# Patient Record
Sex: FEMALE | Race: WHITE | NOT HISPANIC OR LATINO | Employment: FULL TIME
[De-identification: names, ages, dates, MRNs, and addresses within clinical notes are randomized per-mention and may not be internally consistent; named-entity substitution may affect disease eponyms.]

---

## 2017-02-22 ENCOUNTER — HOSPITAL ENCOUNTER (OUTPATIENT)
Dept: ADMINISTRATIVE | Facility: OTHER | Age: 37
Discharge: HOME | End: 2017-02-22
Attending: FAMILY MEDICINE | Admitting: FAMILY MEDICINE

## 2018-10-31 ENCOUNTER — AMBULATORY - HEALTHEAST (OUTPATIENT)
Dept: NURSING | Facility: CLINIC | Age: 38
End: 2018-10-31

## 2018-10-31 DIAGNOSIS — Z23 NEED FOR IMMUNIZATION AGAINST INFLUENZA: ICD-10-CM

## 2018-11-05 ENCOUNTER — COMMUNICATION - HEALTHEAST (OUTPATIENT)
Dept: HEALTH INFORMATION MANAGEMENT | Facility: CLINIC | Age: 38
End: 2018-11-05

## 2018-12-17 ENCOUNTER — OFFICE VISIT - HEALTHEAST (OUTPATIENT)
Dept: FAMILY MEDICINE | Facility: CLINIC | Age: 38
End: 2018-12-17

## 2018-12-17 ENCOUNTER — COMMUNICATION - HEALTHEAST (OUTPATIENT)
Dept: TELEHEALTH | Facility: CLINIC | Age: 38
End: 2018-12-17

## 2018-12-17 DIAGNOSIS — K58.9 IRRITABLE BOWEL SYNDROME, UNSPECIFIED TYPE: ICD-10-CM

## 2018-12-17 DIAGNOSIS — H10.9 CONJUNCTIVITIS OF BOTH EYES, UNSPECIFIED CONJUNCTIVITIS TYPE: ICD-10-CM

## 2018-12-17 ASSESSMENT — MIFFLIN-ST. JEOR: SCORE: 1242.42

## 2018-12-18 ENCOUNTER — COMMUNICATION - HEALTHEAST (OUTPATIENT)
Dept: SCHEDULING | Facility: CLINIC | Age: 38
End: 2018-12-18

## 2018-12-18 DIAGNOSIS — H10.9 BACTERIAL CONJUNCTIVITIS: ICD-10-CM

## 2019-02-25 ENCOUNTER — OFFICE VISIT - HEALTHEAST (OUTPATIENT)
Dept: FAMILY MEDICINE | Facility: CLINIC | Age: 39
End: 2019-02-25

## 2019-02-25 DIAGNOSIS — K58.9 IRRITABLE BOWEL SYNDROME, UNSPECIFIED TYPE: ICD-10-CM

## 2019-02-25 DIAGNOSIS — Z87.898 HX OF MOTION SICKNESS: ICD-10-CM

## 2019-06-17 ENCOUNTER — OFFICE VISIT - HEALTHEAST (OUTPATIENT)
Dept: FAMILY MEDICINE | Facility: CLINIC | Age: 39
End: 2019-06-17

## 2019-06-17 DIAGNOSIS — Z00.00 ROUTINE GENERAL MEDICAL EXAMINATION AT A HEALTH CARE FACILITY: ICD-10-CM

## 2019-06-17 DIAGNOSIS — K58.9 IRRITABLE BOWEL SYNDROME, UNSPECIFIED TYPE: ICD-10-CM

## 2019-06-17 LAB
CHOLEST SERPL-MCNC: 167 MG/DL
FASTING STATUS PATIENT QL REPORTED: YES
FASTING STATUS PATIENT QL REPORTED: YES
GLUCOSE BLD-MCNC: 90 MG/DL (ref 70–99)
HDLC SERPL-MCNC: 63 MG/DL
LDLC SERPL CALC-MCNC: 96 MG/DL
TRIGL SERPL-MCNC: 42 MG/DL

## 2019-06-17 ASSESSMENT — MIFFLIN-ST. JEOR: SCORE: 1258.18

## 2019-06-26 ENCOUNTER — COMMUNICATION - HEALTHEAST (OUTPATIENT)
Dept: FAMILY MEDICINE | Facility: CLINIC | Age: 39
End: 2019-06-26

## 2019-06-26 DIAGNOSIS — K58.9 IRRITABLE BOWEL SYNDROME, UNSPECIFIED TYPE: ICD-10-CM

## 2019-08-12 ENCOUNTER — OFFICE VISIT - HEALTHEAST (OUTPATIENT)
Dept: FAMILY MEDICINE | Facility: CLINIC | Age: 39
End: 2019-08-12

## 2019-08-12 DIAGNOSIS — Z97.5 IUD CONTRACEPTION: ICD-10-CM

## 2019-08-12 ASSESSMENT — MIFFLIN-ST. JEOR: SCORE: 1275.76

## 2019-08-13 ENCOUNTER — OFFICE VISIT - HEALTHEAST (OUTPATIENT)
Dept: FAMILY MEDICINE | Facility: CLINIC | Age: 39
End: 2019-08-13

## 2019-08-13 DIAGNOSIS — Z30.430 ENCOUNTER FOR IUD INSERTION: ICD-10-CM

## 2019-08-13 LAB — HCG UR QL: NEGATIVE

## 2019-08-13 ASSESSMENT — MIFFLIN-ST. JEOR: SCORE: 1275.76

## 2019-12-05 ENCOUNTER — OFFICE VISIT - HEALTHEAST (OUTPATIENT)
Dept: FAMILY MEDICINE | Facility: CLINIC | Age: 39
End: 2019-12-05

## 2019-12-05 DIAGNOSIS — T75.3XXS CAR SICKNESS, SEQUELA: ICD-10-CM

## 2019-12-05 DIAGNOSIS — Z87.898 HX OF MOTION SICKNESS: ICD-10-CM

## 2019-12-05 DIAGNOSIS — R10.13 ABDOMINAL PAIN, EPIGASTRIC: ICD-10-CM

## 2019-12-05 LAB
ALBUMIN SERPL-MCNC: 4.1 G/DL (ref 3.5–5)
ALP SERPL-CCNC: 38 U/L (ref 45–120)
ALT SERPL W P-5'-P-CCNC: 11 U/L (ref 0–45)
ANION GAP SERPL CALCULATED.3IONS-SCNC: 9 MMOL/L (ref 5–18)
AST SERPL W P-5'-P-CCNC: 18 U/L (ref 0–40)
BILIRUB SERPL-MCNC: 1.3 MG/DL (ref 0–1)
BUN SERPL-MCNC: 19 MG/DL (ref 8–22)
CALCIUM SERPL-MCNC: 9.6 MG/DL (ref 8.5–10.5)
CHLORIDE BLD-SCNC: 104 MMOL/L (ref 98–107)
CO2 SERPL-SCNC: 27 MMOL/L (ref 22–31)
CREAT SERPL-MCNC: 0.76 MG/DL (ref 0.6–1.1)
ERYTHROCYTE [DISTWIDTH] IN BLOOD BY AUTOMATED COUNT: 10.3 % (ref 11–14.5)
GFR SERPL CREATININE-BSD FRML MDRD: >60 ML/MIN/1.73M2
GLUCOSE BLD-MCNC: 91 MG/DL (ref 70–125)
HCT VFR BLD AUTO: 38.1 % (ref 35–47)
HGB BLD-MCNC: 13.4 G/DL (ref 12–16)
LIPASE SERPL-CCNC: 9 U/L (ref 0–52)
MCH RBC QN AUTO: 30.6 PG (ref 27–34)
MCHC RBC AUTO-ENTMCNC: 35.2 G/DL (ref 32–36)
MCV RBC AUTO: 87 FL (ref 80–100)
PLATELET # BLD AUTO: 278 THOU/UL (ref 140–440)
PMV BLD AUTO: 8.2 FL (ref 7–10)
POTASSIUM BLD-SCNC: 4.4 MMOL/L (ref 3.5–5)
PROT SERPL-MCNC: 7 G/DL (ref 6–8)
RBC # BLD AUTO: 4.39 MILL/UL (ref 3.8–5.4)
SODIUM SERPL-SCNC: 140 MMOL/L (ref 136–145)
TSH SERPL DL<=0.005 MIU/L-ACNC: 0.67 UIU/ML (ref 0.3–5)
WBC: 5.7 THOU/UL (ref 4–11)

## 2019-12-05 ASSESSMENT — MIFFLIN-ST. JEOR: SCORE: 1294.19

## 2019-12-06 ENCOUNTER — RECORDS - HEALTHEAST (OUTPATIENT)
Dept: ADMINISTRATIVE | Facility: OTHER | Age: 39
End: 2019-12-06

## 2019-12-09 LAB
GLIADIN IGA SER-ACNC: 0.4 U/ML
GLIADIN IGG SER-ACNC: 2.1 U/ML
IGA SERPL-MCNC: 135 MG/DL (ref 65–400)
TTG IGA SER-ACNC: 0.2 U/ML
TTG IGG SER-ACNC: <0.6 U/ML

## 2019-12-13 ENCOUNTER — RECORDS - HEALTHEAST (OUTPATIENT)
Dept: ADMINISTRATIVE | Facility: OTHER | Age: 39
End: 2019-12-13

## 2019-12-17 ENCOUNTER — RECORDS - HEALTHEAST (OUTPATIENT)
Dept: ADMINISTRATIVE | Facility: OTHER | Age: 39
End: 2019-12-17

## 2020-03-21 ENCOUNTER — COMMUNICATION - HEALTHEAST (OUTPATIENT)
Dept: FAMILY MEDICINE | Facility: CLINIC | Age: 40
End: 2020-03-21

## 2020-03-21 DIAGNOSIS — K58.9 IRRITABLE BOWEL SYNDROME, UNSPECIFIED TYPE: ICD-10-CM

## 2020-10-06 ENCOUNTER — VIRTUAL VISIT (OUTPATIENT)
Dept: FAMILY MEDICINE | Facility: OTHER | Age: 40
End: 2020-10-06

## 2020-10-07 NOTE — PROGRESS NOTES
"Date: 10/06/2020 19:00:51  Clinician: Fidencio Knowles  Clinician NPI: [id number]  Patient: Braxton Kilgore  Patient : 1980  Patient Address: [address] [place] [address]  Patient Phone: [phone]  Visit Protocol: URI  Patient Summary:  Braxotn is a 40 year old ( : 1980 ) female who initiated a [facility] Visit for COVID-19 (Coronavirus) evaluation and screening. When asked the question \"Please sign me up to receive news, health information and promotions from [facility].\", Braxton responded \"No\".    Braxton states her symptoms started today.   Her symptoms consist of enlarged lymph nodes, a cough, nasal congestion, anosmia, rhinitis, nausea, myalgia, chills, malaise, and a sore throat. Braxton also feels feverish.   Symptom details     Nasal secretions: The color of her mucus is clear.    Cough: Braxton coughs a few times an hour and her cough is not more bothersome at night. Phlegm does not come into her throat when she coughs. She does not believe her cough is caused by post-nasal drip.     Sore throat: Braxton reports having mild throat pain (1-3 on a 10 point pain scale), does not have exudate on her tonsils, and can swallow liquids. The lymph nodes in her neck are enlarged. A rash has not appeared on the skin since the sore throat started.     Temperature: Her current temperature is 102 degrees Fahrenheit. Braxton has had a temperature over 100 degrees Fahrenheit for 1-2 days.      Braxton denies having ear pain, headache, wheezing, vomiting, facial pain or pressure, teeth pain, ageusia, and diarrhea. She also denies taking antibiotic medication in the past month and having recent facial or sinus surgery in the past 60 days. She is not experiencing dyspnea.   Precipitating events  Within the past week, Braxton has not been exposed to someone with strep throat. She has not recently been exposed to someone with influenza. Braxton has been in close contact with the following high risk individuals: children " under the age of 5.   Pertinent COVID-19 (Coronavirus) information  In the past 14 days, Braxton has not worked in a congregate living setting.   She either works or volunteers as a healthcare worker or a , or works or volunteers in a healthcare facility. She does not provide direct patient care. 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 She lives with a healthcare worker.   Braxton has had a close contact with a laboratory-confirmed COVID-19 patient within 14 days of symptom onset. She was not exposed at her work. Additional information about contact with COVID-19 (Coronavirus) patient as reported by the patient (free text): school   Since December 2019, Braxton and has not had upper respiratory infection or influenza-like illness. Has not been diagnosed with lab-confirmed COVID-19 test   Pertinent medical history  Braxton typically gets a yeast infection when she takes antibiotics. She has used fluconazole (Diflucan) to treat previous yeast infections. 2 doses of fluconazole (Diflucan) has typically been needed for symptoms to resolve in the past.  Braxton needs a return to work/school note.   Weight: 145 lbs   Braxton does not smoke or use smokeless tobacco.   She denies pregnancy and denies breastfeeding. She does not menstruate.   Weight: 145 lbs    MEDICATIONS: No current medications, ALLERGIES: NKDA  Clinician Response:  Dear Braxton,   Your symptoms show that you may have coronavirus (COVID-19). This illness can cause fever, cough and trouble breathing. Many people get a mild case and get better on their own. Some people can get very sick.  Based on the symptoms you have shared, I would like you to be re-checked in 2 to 3 days. Please call your family clinic to set up a video or phone visit.  Will I be tested for COVID-19?  We would like to test you for this virus.   Please call  "[phone] to schedule your visit. Explain that you were [facility] have [facility] COVID-19 [facility]. Be ready to share your [facility] visit ID number.   The following will serve as your written order for this COVID Test, ordered by me, for the indication of suspected COVID [Z20.828]: The test will be ordered in [employer], our electronic health record, after you are scheduled. It will show as ordered and authorized by Magen Plata MD.  Order: COVID-19 (Coronavirus) PCR for SYMPTOMATIC testing from [facility].  1.When it's time for your COVID test:   Stay at least 6 feet away from others. (If someone will drive you to your test, stay in the backseat, as far away from the  as you can.)   Cover your mouth and nose with a mask, tissue or washcloth.  Go straight to the testing site. Don't make any stops on the way there or back.      2.Starting now: Stay home and away from others (self-isolate) until:   You've had no fever---and no medicine that reduces fever---for one full day (24 hours). And...   Your other symptoms have gotten better. For example, your cough or breathing has improved. And...   At least 10 days have passed since your symptoms started.       During this time, don't leave the house except for testing or medical care.   Stay in your own room, even for meals. Use your own bathroom if you can.   Stay away from others in your home. No hugging, kissing or shaking hands. No visitors.  Don't go to work, school or anywhere else.    Clean \"high touch\" surfaces often (doorknobs, counters, handles, etc.). Use a household cleaning spray or wipes. You'll find a full list of  on the EPA website: www.epa.gov/pesticide-registration/list-n-disinfectants-use-against-sars-cov-2.   Cover your mouth and nose with a mask, tissue or washcloth to avoid spreading germs.  Wash your hands and face often. Use soap and water.  Caregivers in these groups are at risk for severe illness due to COVID-19:  o People 65 years and older  " o People who live in a nursing home or long-term care facility  o People with chronic disease (lung, heart, cancer, diabetes, kidney, liver, immunologic)   o People who have a weakened immune system, including those who:   Are in cancer treatment  Take medicine that weakens the immune system, such as corticosteroids  Had a bone marrow or organ transplant  Have an immune deficiency  Have poorly controlled HIV or AIDS  Are obese (body mass index of 40 or higher)  Smoke regularly   o Caregivers should wear gloves while washing dishes, handling laundry and cleaning bedrooms and bathrooms.  o Use caution when washing and drying laundry: Don't shake dirty laundry, and use the warmest water setting that you can.  o For more tips, go to www.cdc.gov/coronavirus/2019-ncov/downloads/10Things.pdf.      How can I take care of myself?   Get lots of rest. Drink extra fluids (unless a doctor has told you not to)   Take Tylenol (acetaminophen) for fever or pain. If you have liver or kidney problems, ask your family doctor if it's okay to take Tylenol.   Adults can take either:    650 mg (two 325 mg pills) every 4 to 6 hours, or...   1,000 mg (two 500 mg pills) every 8 hours as needed.    Note: Don't take more than 3,000 mg in one day. Acetaminophen is found in many medicines (both prescribed and over-the-counter medicines). Read all labels to be sure you don't take too much.   For children, check the Tylenol bottle for the right dose. The dose is based on the child's age or weight.    If you have other health problems (like cancer, heart failure, an organ transplant or severe kidney disease): Call your specialty clinic if you don't feel better in the next 2 days.       Know when to call 911. Emergency warning signs include:    Trouble breathing or shortness of breath Pain or pressure in the chest that doesn't go away Feeling confused like you haven't felt before, or not being able to wake up Bluish-colored lips or face  Where can I  get more information?   [facility] -- About COVID-19: [email or web address]   CDC -- What to Do If You're Sick: www.cdc.gov/coronavirus/2019-ncov/about/steps-when-sick.html   [facility] -- Ending Home Isolation: www.cdc.gov/coronavirus/2019-ncov/hcp/disposition-in-home-patients.html   [facility] -- Caring for Someone: www.cdc.gov/coronavirus/2019-ncov/if-you-are-sick/care-for-someone.html   [facility] -- Interim Guidance for Hospital Discharge to Home: www.[facility].[place]/diseases/coronavirus/hcp/hospdischarge.pdf   [facility] clinical trials (COVID-19 research studies): [email or web address]-clinical-trials    Below are the COVID-19 hotlines at the [place] [facility] ([facility]). Interpreters are available.    For health questions: Call 198-079-3079 or 1-888.758.1051 (7 a.m. to 7 p.m.) For questions about schools and childcare: Call 231-046-7855 or 1-579.409.6278 (7 a.m. to 7 p.m.)       Diagnosis: Cough  Diagnosis ICD: R05

## 2020-10-08 ENCOUNTER — AMBULATORY - HEALTHEAST (OUTPATIENT)
Dept: FAMILY MEDICINE | Facility: CLINIC | Age: 40
End: 2020-10-08

## 2020-10-08 DIAGNOSIS — Z20.822 SUSPECTED 2019 NOVEL CORONAVIRUS INFECTION: ICD-10-CM

## 2020-10-09 ENCOUNTER — AMBULATORY - HEALTHEAST (OUTPATIENT)
Dept: FAMILY MEDICINE | Facility: CLINIC | Age: 40
End: 2020-10-09

## 2020-10-09 DIAGNOSIS — Z20.822 SUSPECTED 2019 NOVEL CORONAVIRUS INFECTION: ICD-10-CM

## 2020-10-11 ENCOUNTER — COMMUNICATION - HEALTHEAST (OUTPATIENT)
Dept: SCHEDULING | Facility: CLINIC | Age: 40
End: 2020-10-11

## 2020-12-12 ENCOUNTER — COMMUNICATION - HEALTHEAST (OUTPATIENT)
Dept: FAMILY MEDICINE | Facility: CLINIC | Age: 40
End: 2020-12-12

## 2020-12-12 DIAGNOSIS — Z87.898 HX OF MOTION SICKNESS: ICD-10-CM

## 2020-12-16 ENCOUNTER — COMMUNICATION - HEALTHEAST (OUTPATIENT)
Dept: FAMILY MEDICINE | Facility: CLINIC | Age: 40
End: 2020-12-16

## 2020-12-16 DIAGNOSIS — K58.9 IRRITABLE BOWEL SYNDROME, UNSPECIFIED TYPE: ICD-10-CM

## 2021-05-30 NOTE — TELEPHONE ENCOUNTER
Refill Approved    Rx renewed per Medication Renewal Policy. Medication was last renewed on 2/25/19.    Bernarda Vega, [facility] Triage/Med Refill 6/27/2019     Requested Prescriptions   Pending Prescriptions Disp Refills     amitriptyline (ELAVIL) 25 MG tablet [Pharmacy Med Name: AMITRIPTYLINE HCL 25 MG TAB] 30 tablet 3     Sig: TAKE 1 TABLET BY MOUTH EVERYDAY AT BEDTIME       Tricyclics/Misc Antidepressant/Antianxiety Meds Refill Protocol Passed - 6/26/2019  2:26 AM        Passed - PCP or prescribing provider visit in last year     Last office visit with prescriber/PCP: 2/25/2019 Cecy Mills MD OR same dept: 2/25/2019 Cecy Mills MD OR same specialty: 2/25/2019 Cecy Mills MD  Last physical: Visit date not found Last MTM visit: Visit date not found   Next visit within 3 mo: Visit date not found  Next physical within 3 mo: Visit date not found  Prescriber OR PCP: Cecy Mills MD  Last diagnosis associated with med order: 1. Irritable bowel syndrome, unspecified type  - amitriptyline (ELAVIL) 25 MG tablet [Pharmacy Med Name: AMITRIPTYLINE HCL 25 MG TAB]; TAKE 1 TABLET BY MOUTH EVERYDAY AT BEDTIME  Dispense: 30 tablet; Refill: 3    If protocol passes may refill for 12 months if within 3 months of last provider visit (or a total of 15 months).

## 2021-05-31 NOTE — PROGRESS NOTES
IUD Insertion Procedure Note    Pre-operative Diagnosis: Contraception, mirena IUD Insertion    Post-operative Diagnosis: same    Indications: contraception    Procedure Details   Urine pregnancy test was done and result was negative.  The risks (including infection, bleeding, pain, and uterine perforation) and benefits of the procedure were explained to the patient and Written informed consent was obtained.      Cervix cleansed with Betadine. Uterus sounded to 7 cm. IUD inserted without difficulty. String visible and trimmed. Patient tolerated procedure well.    Condition:  Stable    Complications:  None    Plan:  The patient was advised to call for any fever or for prolonged or severe pain or bleeding. She was advised to use OTC analgesics as needed for mild to moderate pain.  She was advised to feel for the strings in 2 weeks or come in if she is unable to feel the strings.

## 2021-05-31 NOTE — PROGRESS NOTES
ASSESSMENT/PLAN:  IUD contraception  This is a 38-year-old female who comes in today for IUD consultation.  She has already schedule an appointment tomorrow for IUD insertion.  Nonetheless, she was very upset that she had to have a consultation prior to IUD insertion.  She had a Mirena IUD removed in 2015.  She had a baby since who is now 2 years old.  Over the last 2 years she has not had any birth control.  She is interested in Mirena IUD.  She stated that she does not have any questions or concerns but was very upset that she had to make this appointment prior to IUD insertion.  I did spend a few minutes discussing with her the hormonal aspect of Mirena IUD as far as contraception and regulation of her menstrual cycles.  I also spent a few minutes discussing with her about the procedure, risks, and benefits.  She walked out on me prior to completion of our visit      CHIEF COMPLAINT:  Chief Complaint   Patient presents with     IUD consult     Mirena       HISTORY OF PRESENT ILLNESS:  Braxton is a 38 y.o. female presenting to the clinic today for an IUD consultation. The patient states that she would like to get the Mirena for her birth control.  She confirms that she recently had a child, who is now currently 2 years old. The patient states that her period has been normal and her last one was on Thursday.     REVIEW OF SYSTEMS:   Constitutional: Negative.   HENT: Negative.   Eyes: Negative.   Respiratory: Negative.   Cardiovascular: Negative.   Gastrointestinal: Negative.   Endocrine: Negative.   Genitourinary: Negative.   Musculoskeletal: Negative.   Skin: Negative.   Allergic/Immunologic: Negative.   Neurological: Negative.   Hematological: Negative.   Psychiatric/Behavioral: Negative.   All other systems are negative.    PFSH:  None.     TOBACCO USE:  Social History     Tobacco Use   Smoking Status Never Smoker   Smokeless Tobacco Never Used       VITALS:  Vitals:    08/12/19 1600   BP: 110/64   Pulse: 84  "  Weight: 134 lb (60.8 kg)   Height: 5' 4.5\" (1.638 m)     Wt Readings from Last 3 Encounters:   08/12/19 134 lb (60.8 kg)   06/17/19 130 lb 2 oz (59 kg)   02/25/19 128 lb (58.1 kg)       PHYSICAL EXAM:  Constitutional: Patient is oriented to person, place, and time. Patient appears well-developed and well-nourished. No distress.   Head: Normocephalic and atraumatic.   Right Ear: External ear normal.   Left Ear: External ear normal.   Nose: Nose normal.   Eyes: Conjunctivae and EOM are normal. Right eye exhibits no discharge. Left eye exhibits no discharge. No scleral icterus.   Skin: No rash noted. Patient is not diaphoretic. No erythema. No pallor.      Results for orders placed or performed in visit on 06/17/19   Glucose   Result Value Ref Range    Glucose 90 70 - 99 mg/dL    Patient Fasting > 8hrs? Yes    Lipid Cascade   Result Value Ref Range    Cholesterol 167 <=199 mg/dL    Triglycerides 42 <=149 mg/dL    HDL Cholesterol 63 >=50 mg/dL    LDL Calculated 96 <=129 mg/dL    Patient Fasting > 8hrs? Yes          ADDITIONAL HISTORY SUMMARIZED (2): None.  DECISION TO OBTAIN EXTRA INFORMATION (1): None.   RADIOLOGY TESTS (1): None.  LABS (1): None.  MEDICINE TESTS (1): None.  INDEPENDENT REVIEW (2 each): None.     Total data points:0    The visit lasted a total of 6 minutes face to face with the patient. Over 50% of the time was spent counseling and educating the patient about birth control options.     I, Linda Winn, am scribing for and in the presence of, Dr. Hassan.    IDr. Hassan, personally performed the services described in this documentation, as scribed by Linda Winn in my presence, and it is both accurate and complete.    MEDICATIONS:  Current Outpatient Medications   Medication Sig Dispense Refill     amitriptyline (ELAVIL) 25 MG tablet Take 1 tablet (25 mg total) by mouth at bedtime. 90 tablet 2     ondansetron (ZOFRAN ODT) 4 MG disintegrating tablet Take 1 tablet (4 mg total) by mouth every 8 (eight) " hours as needed for nausea. 30 tablet 1     ondansetron (ZOFRAN) 4 MG tablet Take 1 tablet (4 mg total) by mouth every 8 (eight) hours as needed for nausea. 30 tablet 1     No current facility-administered medications for this visit.

## 2021-06-01 ENCOUNTER — RECORDS - HEALTHEAST (OUTPATIENT)
Dept: ADMINISTRATIVE | Facility: CLINIC | Age: 41
End: 2021-06-01

## 2021-06-02 ENCOUNTER — RECORDS - HEALTHEAST (OUTPATIENT)
Dept: ADMINISTRATIVE | Facility: CLINIC | Age: 41
End: 2021-06-02

## 2021-06-02 VITALS — BODY MASS INDEX: 21.3 KG/M2 | WEIGHT: 128 LBS

## 2021-06-02 VITALS — BODY MASS INDEX: 20.81 KG/M2 | WEIGHT: 124.9 LBS | HEIGHT: 65 IN

## 2021-06-03 VITALS — WEIGHT: 134 LBS | BODY MASS INDEX: 22.33 KG/M2 | HEIGHT: 65 IN

## 2021-06-03 VITALS — BODY MASS INDEX: 22.33 KG/M2 | WEIGHT: 134 LBS | HEIGHT: 65 IN

## 2021-06-03 VITALS — BODY MASS INDEX: 21.68 KG/M2 | WEIGHT: 130.13 LBS | HEIGHT: 65 IN

## 2021-06-04 VITALS
SYSTOLIC BLOOD PRESSURE: 104 MMHG | BODY MASS INDEX: 23 KG/M2 | WEIGHT: 138.06 LBS | DIASTOLIC BLOOD PRESSURE: 68 MMHG | HEIGHT: 65 IN | HEART RATE: 68 BPM

## 2021-06-04 NOTE — PROGRESS NOTES
ASSESSMENT/PLAN:    Car sickness, Hx of motion sickness  refilled  -     ondansetron (ZOFRAN ODT) 4 MG disintegrating tablet; Take 1 tablet (4 mg total) by mouth every 8 (eight) hours as needed for nausea.    Abdominal pain, epigastric with h/o IBS & h/o h pylori infection  -     Ambulatory referral for Upper GI Endoscopy  -     HM2(CBC w/o Differential)  -     Celiac(Gluten)Antibody Panel  -     Comprehensive Metabolic Panel  -     Lipase  -     Thyroid Stimulating Hormone (TSH)  Continue with amitriptyline  Will communicate results to patient  Further management will depend on results and clinical status    Orders Placed This Encounter   Procedures     HM2(CBC w/o Differential)     Celiac(Gluten)Antibody Panel     Comprehensive Metabolic Panel     Lipase     Thyroid Stimulating Hormone (TSH)     Ambulatory referral for Upper GI Endoscopy     Referral Priority:   Routine     Referral Type:   Diagnostic Imaging     Referral Reason:   Evaluation and Treatment     Requested Specialty:   Gastroenterology     Number of Visits Requested:   1     CHIEF COMPLAINT:  Chief Complaint   Patient presents with     med check     possible ulcer     stomach pain, had this before, episodes of vomite,      Medication Refill     zofran       HISTORY OF PRESENT ILLNESS:  Braxton is a 39 y.o. female presenting to the clinic today for car sickness and stomach pain.     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 Some days she will feel okay, but other days she will get car sick. Whenever she gets car sick, she will take zofran 4 mg. She does not use it often. She has been this way since she was a child. It is especially bad if she does not eat and then reads in a car.     Stomach Pain: Recently, she has been having pain in her epigastric region. She has also been waking up in the middle of the night throwing up. This has happened multiple times. It is improved by eating. She has not taken  "any ibuprofen, tylenol or aleve. She has a history of gastrointestinal problems. She has irritable bowel syndrome which is well-managed with amitriptyline and maintains a plant-based diet. In 2011, she was having similar stomach problems that she is experiencing now. In 2011 she tested positive for H-pylori. She never had a post-treatment test. She inquires if this is a possibility again. She notes that she had a lot of other illnesses during that same time. Therefore, she is unsure what the exact symptoms of H-pylori look like. She also mentions that in 2011 a doctor from [facility] told her that her liver \"looked funny\" on the scans. However, she was septic at the time so he did not look into it further.     REVIEW OF SYSTEMS:   Constitutional: Negative.   HENT: Negative.   Eyes: Negative.   Respiratory: Negative.   Cardiovascular: Negative.   Gastrointestinal: Negative.   Endocrine: Negative.   Genitourinary: Negative.   Musculoskeletal: Negative.   Skin: Negative.   Allergic/Immunologic: Negative.   Neurological: Negative.   Hematological: Negative.   Psychiatric/Behavioral: Negative.   All other systems are negative.    PFSH:  She's been working for the police department since May. 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     TOBACCO USE:  Social History     Tobacco Use   Smoking Status Never Smoker   Smokeless Tobacco Never Used       VITALS:  Vitals:    12/05/19 1358   BP: 104/68   Patient Site: Left Arm   Patient Position: Sitting   Cuff Size: Adult Regular   Pulse: 68   Weight: 138 lb 1 oz (62.6 kg)   Height: 5' 4.5\" (1.638 m)     Wt Readings from Last 3 Encounters:   12/05/19 138 lb 1 oz (62.6 kg)   08/13/19 134 lb (60.8 kg)   08/12/19 134 lb (60.8 kg)       PHYSICAL EXAM:  Constitutional: Patient is oriented to person, place, and time. Patient appears well-developed and well-nourished. No distress.   Head: Normocephalic and atraumatic.   Right Ear: External ear normal.   Left Ear: External ear " normal.   Nose: Nose normal.   Eyes: Conjunctivae and EOM are normal. Right eye exhibits no discharge. Left eye exhibits no discharge. No scleral icterus.   Neurological: Patient is alert and oriented to person, place, and time. No cranial nerve deficit. Coordination normal.   Skin: No rash noted. Patient is not diaphoretic. No erythema. No pallor.    ADDITIONAL HISTORY SUMMARIZED (2): None.  DECISION TO OBTAIN EXTRA INFORMATION (1): None.   RADIOLOGY TESTS (1): None.  LABS (1): Reviewed labs from 09/25/18 and ordered labs today.   MEDICINE TESTS (1): None.  INDEPENDENT REVIEW (2 each): None.     Sarah ALEX,  am scribing for and in the presence of, Dr. Hassan.    I, Dr. Hassan, personally performed the services described in this documentation, as scribed by Sarah Yanes in my presence, and it is both accurate and complete.    MEDICATIONS:  Current Outpatient Medications   Medication Sig Dispense Refill     amitriptyline (ELAVIL) 25 MG tablet Take 1 tablet (25 mg total) by mouth at bedtime. 90 tablet 2     levonorgestrel (MIRENA) 20 mcg/24 hours (5 yrs) 52 mg IUD 1 each by Intrauterine route once. Inserted by Dr. Abril Hassan 8/13/19       ondansetron (ZOFRAN) 4 MG tablet Take 1 tablet (4 mg total) by mouth every 8 (eight) hours as needed for nausea. 30 tablet 1     ondansetron (ZOFRAN ODT) 4 MG disintegrating tablet Take 1 tablet (4 mg total) by mouth every 8 (eight) hours as needed for nausea. 90 tablet 1     No current facility-administered medications for this visit.        Total data points:1

## 2021-06-07 ENCOUNTER — RECORDS - HEALTHEAST (OUTPATIENT)
Dept: ADMINISTRATIVE | Facility: OTHER | Age: 41
End: 2021-06-07

## 2021-06-07 NOTE — TELEPHONE ENCOUNTER
Refill request for medication: 3/21/2020  Last visit addressing this medication: 12/5/19  Follow up plan as needed    Last refill on 6/27/19, quantity #90   CSA completed NA    checked  03/23/20, last dispensed refill NA     Appointment: Not due     Shilpa Barillas, A

## 2021-06-08 ENCOUNTER — APPOINTMENT (OUTPATIENT)
Dept: URBAN - METROPOLITAN AREA CLINIC 260 | Age: 41
Setting detail: DERMATOLOGY
End: 2021-06-09

## 2021-06-08 VITALS — HEIGHT: 65 IN | WEIGHT: 145 LBS

## 2021-06-08 DIAGNOSIS — L70.0 ACNE VULGARIS: ICD-10-CM

## 2021-06-08 DIAGNOSIS — L72.11 PILAR CYST: ICD-10-CM

## 2021-06-08 PROCEDURE — 81025 URINE PREGNANCY TEST: CPT

## 2021-06-08 PROCEDURE — 99204 OFFICE O/P NEW MOD 45 MIN: CPT

## 2021-06-08 PROCEDURE — OTHER ADDITIONAL NOTES: OTHER

## 2021-06-08 PROCEDURE — OTHER COUNSELING: OTHER

## 2021-06-08 PROCEDURE — OTHER ISOTRETINOIN INITIATION: OTHER

## 2021-06-08 PROCEDURE — OTHER URINE PREGNANCY TEST: OTHER

## 2021-06-08 ASSESSMENT — LOCATION SIMPLE DESCRIPTION DERM
LOCATION SIMPLE: RIGHT CHEEK
LOCATION SIMPLE: SUPERIOR FOREHEAD

## 2021-06-08 ASSESSMENT — LOCATION DETAILED DESCRIPTION DERM
LOCATION DETAILED: RIGHT CENTRAL BUCCAL CHEEK
LOCATION DETAILED: SUPERIOR MID FOREHEAD

## 2021-06-08 ASSESSMENT — LOCATION ZONE DERM: LOCATION ZONE: FACE

## 2021-06-08 ASSESSMENT — SEVERITY ASSESSMENT OVERALL AMONG ALL PATIENTS
IN YOUR EXPERIENCE, AMONG ALL PATIENTS YOU HAVE SEEN WITH THIS CONDITION, HOW SEVERE IS THIS PATIENT'S CONDITION?: MULTIPLE INFLAMMATORY LESIONS BUT NONINFLAMMATORY LESIONS PREDOMINATE

## 2021-06-08 NOTE — PROCEDURE: ADDITIONAL NOTES
Detail Level: Simple
Render Risk Assessment In Note?: no
Additional Notes: Fasting labs NOV in 30-37 days.
Additional Notes: Patient uses an encapsulated retinol otc by [employer]. Patient uses EOD. Patient’s skin tolerates this product well.
Additional Notes: Recommend a surgical slot to have removed. Did not measure today.

## 2021-06-09 NOTE — PROGRESS NOTES
[facility] OB Triage    Subjective: Braxton Overton is a  36 y.o. female at 24w3d with a current prenatal history that's unremarkable who presents to OB triage with concerns for contractions. Patient reports tighening after work today, despite fluids.  Had four stronger contractions.  No gush of fluid .   Fetal Movement: normal.    Estimated Date of Delivery: 17 Patient's last menstrual period was 2016 (exact date).  OB provider: Nick Garcia MD  OB History      Para Term  AB TAB SAB Ectopic Multiple Living    5 2 2  2  2   2          Objective:   Blood pressure 110/65, pulse 82, temperature 98.3  F (36.8  C), temperature source Oral, resp. rate 16, last menstrual period 2016, not currently breastfeeding.  General:   alert, appears stated age and cooperative   Skin:   normal   HEENT:  sclera clear, anicteric   Lungs:   clear to auscultation bilaterally   Heart:   regular rate and rhythm, S1, S2 normal, no murmur, click, rub or gallop   Extremities: Warm, dry and well perfused. No edema.   Neuro: Reflexes 2+ and symmetric.    Abdomen: FHT present   Membranes intact   [place]:  None   FHT: Baseline: 140 bpm   Presentation: unsure   Cervix: Not examined   Dilation: Closed   Effacement: Long   Station:  -3   Consistency: firm   Position: middle     Laboratory Studies:   Results for orders placed or performed during the hospital encounter of 17   Urinalysis, OB Screen   Result Value Ref Range    Glucose, UA Negative Negative    Ketones, UA Negative Negative    Protein, UA Negative Negative mg/dL        ASSESSMENT AND PLAN: Braxton Overton is a  36 y.o. female who presented to [facility] [place] at 24w3d on 2017 with concern for PTL.   1. Not in labor.  2. Encouraged fluids.        Douglas Martin MD 2017  [facility]

## 2021-06-09 NOTE — PROGRESS NOTES
"Patient arrived at 2122 with reports of >4 contractions within an hour, denies any change in discharge or vaginal leaking, contractions reported as painless and inconsistent, no aggravating or alleviating factors. Attempted rest and oral rehydration at home. UA/UC sent upon arrival, NST with Category 1 fetal heart tracing, 1 contraction noted throughout stay. IV inserted and 1L fluid infused over 45 minutes. Patient was reassured that baby was \"okay\". Discussed with Dr. Martin, patient okay to discharge to home. Patient advised when to call baby line and to follow-up with  OB/GYN for next scheduled appointment.  "

## 2021-06-13 NOTE — TELEPHONE ENCOUNTER
RN cannot approve Refill Request    RN can NOT refill this medication med is not covered by policy/route to provider. Last office visit: 12/5/2019 Carey Rg MD Last Physical: 6/17/2019 Last MTM visit: Visit date not found Last visit same specialty: 12/5/2019 Carey Rg MD.  Next visit within 3 mo: Visit date not found  Next physical within 3 mo: Visit date not found      Randa Ellington, [facility] Triage/Med Refill 12/12/2020    Requested Prescriptions   Pending Prescriptions Disp Refills     ondansetron (ZOFRAN-ODT) 4 MG disintegrating tablet [Pharmacy Med Name: ONDANSETRON ODT 4 MG TABLET] 18 tablet 9     Sig: TAKE 1 TABLET BY MOUTH EVERY 8 HOURS AS NEEDED FOR NAUSEA       There is no refill protocol information for this order

## 2021-06-13 NOTE — TELEPHONE ENCOUNTER
RN cannot approve Refill Request    RN can NOT refill this medication Protocol failed and NO refill given. Last office visit: 2/25/2019 Cecy Mills MD Last Physical: Visit date not found Last MTM visit: Visit date not found Last visit same specialty: 12/5/2019 Carey Rg MD.  Next visit within 3 mo: Visit date not found  Next physical within 3 mo: Visit date not found      Rebecca Mejia, [facility] [place] Connection Triage/Med Refill 12/18/2020    Requested Prescriptions   Pending Prescriptions Disp Refills     amitriptyline (ELAVIL) 25 MG tablet [Pharmacy Med Name: AMITRIPTYLINE HCL 25 MG TAB] 90 tablet 2     Sig: TAKE 1 TABLET BY MOUTH EVERYDAY AT BEDTIME       Tricyclics/Misc Antidepressant/Antianxiety Meds Refill Protocol Failed - 12/16/2020  8:37 AM        Failed - PCP or prescribing provider visit in last year     Last office visit with prescriber/PCP: 2/25/2019 Cecy Mills MD OR same dept: Visit date not found OR same specialty: 12/5/2019 Caery Rg MD  Last physical: Visit date not found Last MTM visit: Visit date not found   Next visit within 3 mo: Visit date not found  Next physical within 3 mo: Visit date not found  Prescriber OR PCP: Cecy Mills MD  Last diagnosis associated with med order: 1. Irritable bowel syndrome, unspecified type  - amitriptyline (ELAVIL) 25 MG tablet [Pharmacy Med Name: AMITRIPTYLINE HCL 25 MG TAB]; TAKE 1 TABLET BY MOUTH EVERYDAY AT BEDTIME  Dispense: 90 tablet; Refill: 2    If protocol passes may refill for 12 months if within 3 months of last provider visit (or a total of 15 months).

## 2021-06-16 ENCOUNTER — APPOINTMENT (OUTPATIENT)
Dept: URBAN - METROPOLITAN AREA CLINIC 260 | Age: 41
Setting detail: DERMATOLOGY
End: 2021-06-19

## 2021-06-16 DIAGNOSIS — L70.0 ACNE VULGARIS: ICD-10-CM

## 2021-06-16 DIAGNOSIS — L72.11 PILAR CYST: ICD-10-CM

## 2021-06-16 PROCEDURE — 99214 OFFICE O/P EST MOD 30 MIN: CPT | Mod: 25

## 2021-06-16 PROCEDURE — OTHER PATHOLOGY BILLING: OTHER

## 2021-06-16 PROCEDURE — OTHER PRESCRIPTION MEDICATION MANAGEMENT: OTHER

## 2021-06-16 PROCEDURE — OTHER COUNSELING: OTHER

## 2021-06-16 PROCEDURE — 11442 EXC FACE-MM B9+MARG 1.1-2 CM: CPT

## 2021-06-16 PROCEDURE — OTHER EXCISION: OTHER

## 2021-06-16 PROCEDURE — 88305 TISSUE EXAM BY PATHOLOGIST: CPT

## 2021-06-16 PROCEDURE — OTHER PRESCRIPTION: OTHER

## 2021-06-16 RX ORDER — TRETIONIN 0.25 MG/G
CREAM TOPICAL QHS
Qty: 1 | Refills: 1 | Status: ERX

## 2021-06-16 RX ORDER — SPIRONOLACTONE 25 MG/1
25MG TABLET, FILM COATED ORAL BID
Qty: 60 | Refills: 2 | Status: ERX | COMMUNITY
Start: 2021-06-16

## 2021-06-16 RX ORDER — TRETIONIN 0.25 MG/G
0.025% CREAM TOPICAL QHS
Qty: 1 | Refills: 1 | Status: ERX | COMMUNITY
Start: 2021-06-16

## 2021-06-16 ASSESSMENT — LOCATION DETAILED DESCRIPTION DERM
LOCATION DETAILED: SUPERIOR MID FOREHEAD
LOCATION DETAILED: LEFT INFERIOR CENTRAL MALAR CHEEK

## 2021-06-16 ASSESSMENT — LOCATION ZONE DERM: LOCATION ZONE: FACE

## 2021-06-16 ASSESSMENT — LOCATION SIMPLE DESCRIPTION DERM
LOCATION SIMPLE: LEFT CHEEK
LOCATION SIMPLE: SUPERIOR FOREHEAD

## 2021-06-16 NOTE — PROCEDURE: PRESCRIPTION MEDICATION MANAGEMENT
Render In Strict Bullet Format?: No
Plan: Patient elects not to start Isotretinoin as initiated at last office visit. Patient will start Spironolactone 25mg QD for 1 week and if tolerated will begin 25mg BID. Patient will also start use of Tretinoin 0.025% QHS.
Detail Level: Simple

## 2021-06-16 NOTE — HPI: PIMPLES (ACNE)
What Type Of Note Output Would You Prefer (Optional)?: Bullet Format
How Severe Is Your Acne?: moderate
Is This A New Presentation, Or A Follow-Up?: Follow Up Acne
Additional Comments (Use Complete Sentences): Patient declines previously discussed accutane start, due to concern about side effects and possible job interfereance

## 2021-06-16 NOTE — PROCEDURE: EXCISION
Suturegard Intro: Intraoperative tissue expansion was performed, utilizing the SUTUREGARD device, in order to reduce wound tension.
Mercedes Flap Text: The defect edges were debeveled with a #15 scalpel blade.  Given the location of the defect, shape of the defect and the proximity to free margins a Mercedes flap was deemed most appropriate.  Using a sterile surgical marker, an appropriate advancement flap was drawn incorporating the defect and placing the expected incisions within the relaxed skin tension lines where possible. The area thus outlined was incised deep to adipose tissue with a #15 scalpel blade.  The skin margins were undermined to an appropriate distance in all directions utilizing iris scissors.
Complex Repair And Xenograft Text: The defect edges were debeveled with a #15 scalpel blade.  The primary defect was closed partially with a complex linear closure.  Given the location of the defect, shape of the defect and the proximity to free margins a xenograft was deemed most appropriate to repair the remaining defect.  The graft was trimmed to fit the size of the remaining defect.  The graft was then placed in the primary defect, oriented appropriately, and sutured into place.
Repair Type: None (Simple)
Cheek-To-Nose Interpolation Flap Text: A decision was made to reconstruct the defect utilizing an interpolation axial flap and a staged reconstruction.  A telfa template was made of the defect.  This telfa template was then used to outline the Cheek-To-Nose Interpolation flap.  The donor area for the pedicle flap was then injected with anesthesia.  The flap was excised through the skin and subcutaneous tissue down to the layer of the underlying musculature.  The interpolation flap was carefully excised within this deep plane to maintain its blood supply.  The edges of the donor site were undermined.   The donor site was closed in a primary fashion.  The pedicle was then rotated into position and sutured.  Once the tube was sutured into place, adequate blood supply was confirmed with blanching and refill.  The pedicle was then wrapped with xeroform gauze and dressed appropriately with a telfa and gauze bandage to ensure continued blood supply and protect the attached pedicle.
Double O-Z Plasty Text: The defect edges were debeveled with a #15 scalpel blade.  Given the location of the defect, shape of the defect and the proximity to free margins a Double O-Z plasty (double transposition flap) was deemed most appropriate.  Using a sterile surgical marker, the appropriate transposition flaps were drawn incorporating the defect and placing the expected incisions within the relaxed skin tension lines where possible. The area thus outlined was incised deep to adipose tissue with a #15 scalpel blade.  The skin margins were undermined to an appropriate distance in all directions utilizing iris scissors.  Hemostasis was achieved with electrocautery.  The flaps were then transposed into place, one clockwise and the other counterclockwise, and anchored with interrupted buried subcutaneous sutures.
Crescentic Advancement Flap Text: The defect edges were debeveled with a #15 scalpel blade.  Given the location of the defect and the proximity to free margins a crescentic advancement flap was deemed most appropriate.  Using a sterile surgical marker, the appropriate advancement flap was drawn incorporating the defect and placing the expected incisions within the relaxed skin tension lines where possible.    The area thus outlined was incised deep to adipose tissue with a #15 scalpel blade.  The skin margins were undermined to an appropriate distance in all directions utilizing iris scissors.
Wound Care: Petrolatum
Complex Repair And Dorsal Nasal Flap Text: The defect edges were debeveled with a #15 scalpel blade.  The primary defect was closed partially with a complex linear closure.  Given the location of the remaining defect, shape of the defect and the proximity to free margins a dorsal nasal flap was deemed most appropriate for complete closure of the defect.  Using a sterile surgical marker, an appropriate flap was drawn incorporating the defect and placing the expected incisions within the relaxed skin tension lines where possible.    The area thus outlined was incised deep to adipose tissue with a #15 scalpel blade.  The skin margins were undermined to an appropriate distance in all directions utilizing iris scissors.
Island Pedicle Flap With Canthal Suspension Text: The defect edges were debeveled with a #15 scalpel blade.  Given the location of the defect, shape of the defect and the proximity to free margins an island pedicle advancement flap was deemed most appropriate.  Using a sterile surgical marker, an appropriate advancement flap was drawn incorporating the defect, outlining the appropriate donor tissue and placing the expected incisions within the relaxed skin tension lines where possible. The area thus outlined was incised deep to adipose tissue with a #15 scalpel blade.  The skin margins were undermined to an appropriate distance in all directions around the primary defect and laterally outward around the island pedicle utilizing iris scissors.  There was minimal undermining beneath the pedicle flap. A suspension suture was placed in the canthal tendon to prevent tension and prevent ectropion.
Complex Repair And Rhombic Flap Text: The defect edges were debeveled with a #15 scalpel blade.  The primary defect was closed partially with a complex linear closure.  Given the location of the remaining defect, shape of the defect and the proximity to free margins a rhombic flap was deemed most appropriate for complete closure of the defect.  Using a sterile surgical marker, an appropriate advancement flap was drawn incorporating the defect and placing the expected incisions within the relaxed skin tension lines where possible.    The area thus outlined was incised deep to adipose tissue with a #15 scalpel blade.  The skin margins were undermined to an appropriate distance in all directions utilizing iris scissors.
Number Of Epidermal Sutures (Optional): 2
Ear Star Wedge Flap Text: The defect edges were debeveled with a #15 blade scalpel.  Given the location of the defect and the proximity to free margins (helical rim) an ear star wedge flap was deemed most appropriate.  Using a sterile surgical marker, the appropriate flap was drawn incorporating the defect and placing the expected incisions between the helical rim and antihelix where possible.  The area thus outlined was incised through and through with a #15 scalpel blade.
Double M-Plasty Intermediate Repair Preamble Text (Leave Blank If You Do Not Want): Undermining was performed with blunt dissection.
Secondary Defect Width (In Cm): 0
Complex Repair And Modified Advancement Flap Text: The defect edges were debeveled with a #15 scalpel blade.  The primary defect was closed partially with a complex linear closure.  Given the location of the remaining defect, shape of the defect and the proximity to free margins a modified advancement flap was deemed most appropriate for complete closure of the defect.  Using a sterile surgical marker, an appropriate advancement flap was drawn incorporating the defect and placing the expected incisions within the relaxed skin tension lines where possible.    The area thus outlined was incised deep to adipose tissue with a #15 scalpel blade.  The skin margins were undermined to an appropriate distance in all directions utilizing iris scissors.
Vermilion Border Text: The closure involved the vermilion border.
Bill 48362 For Specimen Handling/Conveyance To Laboratory?: no
Show Anatomic Location From Referring Provider Variable: Yes
Scalpel Size: 15 blade
Hemigard Retention Suture: 2-0 Nylon
Retention Suture Text: Retention sutures were placed to support the closure and prevent dehiscence.
Transposition Flap Text: The defect edges were debeveled with a #15 scalpel blade.  Given the location of the defect and the proximity to free margins a transposition flap was deemed most appropriate.  Using a sterile surgical marker, an appropriate transposition flap was drawn incorporating the defect.    The area thus outlined was incised deep to adipose tissue with a #15 scalpel blade.  The skin margins were undermined to an appropriate distance in all directions utilizing iris scissors.
Retention Suture Bite Size: 3 mm
Ftsg Text: The defect edges were debeveled with a #15 scalpel blade.  Given the location of the defect, shape of the defect and the proximity to free margins a full thickness skin graft was deemed most appropriate.  Using a sterile surgical marker, the primary defect shape was transferred to the donor site. The area thus outlined was incised deep to adipose tissue with a #15 scalpel blade.  The harvested graft was then trimmed of adipose tissue until only dermis and epidermis was left.  The skin margins of the secondary defect were undermined to an appropriate distance in all directions utilizing iris scissors.  The secondary defect was closed with interrupted buried subcutaneous sutures.  The skin edges were then re-apposed with running  sutures.  The skin graft was then placed in the primary defect and oriented appropriately.
Lip Wedge Excision Repair Text: Given the location of the defect and the proximity to free margins a full thickness wedge repair was deemed most appropriate.  Using a sterile surgical marker, the appropriate repair was drawn incorporating the defect and placing the expected incisions perpendicular to the vermilion border.  The vermilion border was also meticulously outlined to ensure appropriate reapproximation during the repair.  The area thus outlined was incised through and through with a #15 scalpel blade.  The muscularis and dermis were reaproximated with deep sutures following hemostasis. Care was taken to realign the vermilion border before proceeding with the superficial closure.  Once the vermilion was realigned the superfical and mucosal closure was finished.
Epidermal Closure Graft Donor Site (Optional): simple interrupted
Cheek Interpolation Flap Text: A decision was made to reconstruct the defect utilizing an interpolation axial flap and a staged reconstruction.  A telfa template was made of the defect.  This telfa template was then used to outline the Cheek Interpolation flap.  The donor area for the pedicle flap was then injected with anesthesia.  The flap was excised through the skin and subcutaneous tissue down to the layer of the underlying musculature.  The interpolation flap was carefully excised within this deep plane to maintain its blood supply.  The edges of the donor site were undermined.   The donor site was closed in a primary fashion.  The pedicle was then rotated into position and sutured.  Once the tube was sutured into place, adequate blood supply was confirmed with blanching and refill.  The pedicle was then wrapped with xeroform gauze and dressed appropriately with a telfa and gauze bandage to ensure continued blood supply and protect the attached pedicle.
Star Wedge Flap Text: The defect edges were debeveled with a #15 scalpel blade.  Given the location of the defect, shape of the defect and the proximity to free margins a star wedge flap was deemed most appropriate.  Using a sterile surgical marker, an appropriate rotation flap was drawn incorporating the defect and placing the expected incisions within the relaxed skin tension lines where possible. The area thus outlined was incised deep to adipose tissue with a #15 scalpel blade.  The skin margins were undermined to an appropriate distance in all directions utilizing iris scissors.
Complex Repair And Tissue Cultured Epidermal Autograft Text: The defect edges were debeveled with a #15 scalpel blade.  The primary defect was closed partially with a complex linear closure.  Given the location of the defect, shape of the defect and the proximity to free margins an tissue cultured epidermal autograft was deemed most appropriate to repair the remaining defect.  The graft was trimmed to fit the size of the remaining defect.  The graft was then placed in the primary defect, oriented appropriately, and sutured into place.
Complex Repair And Z Plasty Text: The defect edges were debeveled with a #15 scalpel blade.  The primary defect was closed partially with a complex linear closure.  Given the location of the remaining defect, shape of the defect and the proximity to free margins a Z plasty was deemed most appropriate for complete closure of the defect.  Using a sterile surgical marker, an appropriate advancement flap was drawn incorporating the defect and placing the expected incisions within the relaxed skin tension lines where possible.    The area thus outlined was incised deep to adipose tissue with a #15 scalpel blade.  The skin margins were undermined to an appropriate distance in all directions utilizing iris scissors.
O-Z Plasty Text: The defect edges were debeveled with a #15 scalpel blade.  Given the location of the defect, shape of the defect and the proximity to free margins an O-Z plasty (double transposition flap) was deemed most appropriate.  Using a sterile surgical marker, the appropriate transposition flaps were drawn incorporating the defect and placing the expected incisions within the relaxed skin tension lines where possible.    The area thus outlined was incised deep to adipose tissue with a #15 scalpel blade.  The skin margins were undermined to an appropriate distance in all directions utilizing iris scissors.  Hemostasis was achieved with electrocautery.  The flaps were then transposed into place, one clockwise and the other counterclockwise, and anchored with interrupted buried subcutaneous sutures.
V-Y Flap Text: The defect edges were debeveled with a #15 scalpel blade.  Given the location of the defect, shape of the defect and the proximity to free margins a V-Y flap was deemed most appropriate.  Using a sterile surgical marker, an appropriate advancement flap was drawn incorporating the defect and placing the expected incisions within the relaxed skin tension lines where possible.    The area thus outlined was incised deep to adipose tissue with a #15 scalpel blade.  The skin margins were undermined to an appropriate distance in all directions utilizing iris scissors.
Island Pedicle Flap Text: The defect edges were debeveled with a #15 scalpel blade.  Given the location of the defect, shape of the defect and the proximity to free margins an island pedicle advancement flap was deemed most appropriate.  Using a sterile surgical marker, an appropriate advancement flap was drawn incorporating the defect, outlining the appropriate donor tissue and placing the expected incisions within the relaxed skin tension lines where possible.    The area thus outlined was incised deep to adipose tissue with a #15 scalpel blade.  The skin margins were undermined to an appropriate distance in all directions around the primary defect and laterally outward around the island pedicle utilizing iris scissors.  There was minimal undermining beneath the pedicle flap.
Chonodrocutaneous Helical Advancement Flap Text: The defect edges were debeveled with a #15 scalpel blade.  Given the location of the defect and the proximity to free margins a chondrocutaneous helical advancement flap was deemed most appropriate.  Using a sterile surgical marker, the appropriate advancement flap was drawn incorporating the defect and placing the expected incisions within the relaxed skin tension lines where possible.    The area thus outlined was incised deep to adipose tissue with a #15 scalpel blade.  The skin margins were undermined to an appropriate distance in all directions utilizing iris scissors.
Complex Repair And Rotation Flap Text: The defect edges were debeveled with a #15 scalpel blade.  The primary defect was closed partially with a complex linear closure.  Given the location of the remaining defect, shape of the defect and the proximity to free margins a rotation flap was deemed most appropriate for complete closure of the defect.  Using a sterile surgical marker, an appropriate advancement flap was drawn incorporating the defect and placing the expected incisions within the relaxed skin tension lines where possible.    The area thus outlined was incised deep to adipose tissue with a #15 scalpel blade.  The skin margins were undermined to an appropriate distance in all directions utilizing iris scissors.
Orbicularis Oris Muscle Flap Text: The defect edges were debeveled with a #15 scalpel blade.  Given that the defect affected the competency of the oral sphincter an obicularis oris muscle flap was deemed most appropriate to restore this competency and normal muscle function.  Using a sterile surgical marker, an appropriate flap was drawn incorporating the defect. The area thus outlined was incised with a #15 scalpel blade.
Crescentic Complex Repair Preamble Text (Leave Blank If You Do Not Want): Extensive wide undermining was performed.
Skin Substitute Text: The defect edges were debeveled with a #15 scalpel blade.  Given the location of the defect, shape of the defect and the proximity to free margins a skin substitute graft was deemed most appropriate.  The graft material was trimmed to fit the size of the defect. The graft was then placed in the primary defect and oriented appropriately.
Additional Anesthesia Volume In Cc: 6
Helical Rim Text: The closure involved the helical rim.
Medical Necessity Clause: This procedure was medically necessary because the lesion that was treated was:
Debridement Text: The wound edges were debrided prior to proceeding with the closure to facilitate wound healing.
Consent was obtained from the patient. The risks and benefits to therapy were discussed in detail. Specifically, the risks of infection, scarring, bleeding, prolonged wound healing, incomplete removal, allergy to anesthesia, nerve injury and recurrence were addressed. Prior to the procedure, the treatment site was clearly identified and confirmed by the patient. All components of Universal Protocol/PAUSE Rule completed.
Epidermal Autograft Text: The defect edges were debeveled with a #15 scalpel blade.  Given the location of the defect, shape of the defect and the proximity to free margins an epidermal autograft was deemed most appropriate.  Using a sterile surgical marker, the primary defect shape was transferred to the donor site. The epidermal graft was then harvested.  The skin graft was then placed in the primary defect and oriented appropriately.
Hemostasis: Ligature
Spiral Flap Text: The defect edges were debeveled with a #15 scalpel blade.  Given the location of the defect, shape of the defect and the proximity to free margins a spiral flap was deemed most appropriate.  Using a sterile surgical marker, an appropriate rotation flap was drawn incorporating the defect and placing the expected incisions within the relaxed skin tension lines where possible. The area thus outlined was incised deep to adipose tissue with a #15 scalpel blade.  The skin margins were undermined to an appropriate distance in all directions utilizing iris scissors.
Complex Repair And Dermal Autograft Text: The defect edges were debeveled with a #15 scalpel blade.  The primary defect was closed partially with a complex linear closure.  Given the location of the defect, shape of the defect and the proximity to free margins an dermal autograft was deemed most appropriate to repair the remaining defect.  The graft was trimmed to fit the size of the remaining defect.  The graft was then placed in the primary defect, oriented appropriately, and sutured into place.
Paramedian Forehead Flap Text: A decision was made to reconstruct the defect utilizing an interpolation axial flap and a staged reconstruction.  A telfa template was made of the defect.  This telfa template was then used to outline the paramedian forehead pedicle flap.  The donor area for the pedicle flap was then injected with anesthesia.  The flap was excised through the skin and subcutaneous tissue down to the layer of the underlying musculature.  The pedicle flap was carefully excised within this deep plane to maintain its blood supply.  The edges of the donor site were undermined.   The donor site was closed in a primary fashion.  The pedicle was then rotated into position and sutured.  Once the tube was sutured into place, adequate blood supply was confirmed with blanching and refill.  The pedicle was then wrapped with xeroform gauze and dressed appropriately with a telfa and gauze bandage to ensure continued blood supply and protect the attached pedicle.
Billing Type: Client Bill
Double O-Z Flap Text: The defect edges were debeveled with a #15 scalpel blade.  Given the location of the defect, shape of the defect and the proximity to free margins a Double O-Z flap was deemed most appropriate.  Using a sterile surgical marker, an appropriate transposition flap was drawn incorporating the defect and placing the expected incisions within the relaxed skin tension lines where possible. The area thus outlined was incised deep to adipose tissue with a #15 scalpel blade.  The skin margins were undermined to an appropriate distance in all directions utilizing iris scissors.
Complex Repair And W Plasty Text: The defect edges were debeveled with a #15 scalpel blade.  The primary defect was closed partially with a complex linear closure.  Given the location of the remaining defect, shape of the defect and the proximity to free margins a W plasty was deemed most appropriate for complete closure of the defect.  Using a sterile surgical marker, an appropriate advancement flap was drawn incorporating the defect and placing the expected incisions within the relaxed skin tension lines where possible.    The area thus outlined was incised deep to adipose tissue with a #15 scalpel blade.  The skin margins were undermined to an appropriate distance in all directions utilizing iris scissors.
Zygomaticofacial Flap Text: Given the location of the defect, shape of the defect and the proximity to free margins a zygomaticofacial flap was deemed most appropriate for repair.  Using a sterile surgical marker, the appropriate flap was drawn incorporating the defect and placing the expected incisions within the relaxed skin tension lines where possible. The area thus outlined was incised deep to adipose tissue with a #15 scalpel blade with preservation of a vascular pedicle.  The skin margins were undermined to an appropriate distance in all directions utilizing iris scissors.  The flap was then placed into the defect and anchored with interrupted buried subcutaneous sutures.
Where Do You Want The Question To Include Opioid Counseling Located?: Case Summary Tab
Number Of Hemigard Strips Per Side: 1
Home Suture Removal Text: Patient was provided a home suture removal kit and will remove their sutures at home.  If they have any questions or difficulties they will call the office.
Complex Repair And Double Advancement Flap Text: The defect edges were debeveled with a #15 scalpel blade.  The primary defect was closed partially with a complex linear closure.  Given the location of the remaining defect, shape of the defect and the proximity to free margins a double advancement flap was deemed most appropriate for complete closure of the defect.  Using a sterile surgical marker, an appropriate advancement flap was drawn incorporating the defect and placing the expected incisions within the relaxed skin tension lines where possible.    The area thus outlined was incised deep to adipose tissue with a #15 scalpel blade.  The skin margins were undermined to an appropriate distance in all directions utilizing iris scissors.
Bilateral Helical Rim Advancement Flap Text: The defect edges were debeveled with a #15 blade scalpel.  Given the location of the defect and the proximity to free margins (helical rim) a bilateral helical rim advancement flap was deemed most appropriate.  Using a sterile surgical marker, the appropriate advancement flaps were drawn incorporating the defect and placing the expected incisions between the helical rim and antihelix where possible.  The area thus outlined was incised through and through with a #15 scalpel blade.  With a skin hook and iris scissors, the flaps were gently and sharply undermined and freed up.
Perilesional Excision Additional Text (Leave Blank If You Do Not Want): The margin was drawn around the clinically apparent lesion. Incisions were then made along these lines to the appropriate tissue plane and the lesion was extirpated.
Dermal Autograft Text: The defect edges were debeveled with a #15 scalpel blade.  Given the location of the defect, shape of the defect and the proximity to free margins a dermal autograft was deemed most appropriate.  Using a sterile surgical marker, the primary defect shape was transferred to the donor site. The area thus outlined was incised deep to adipose tissue with a #15 scalpel blade.  The harvested graft was then trimmed of adipose and epidermal tissue until only dermis was left.  The skin graft was then placed in the primary defect and oriented appropriately.
Nasalis-Muscle-Based Myocutaneous Island Pedicle Flap Text: Using a #15 blade, an incision was made around the donor flap to the level of the nasalis muscle. Wide lateral undermining was then performed in both the subcutaneous plane above the nasalis muscle, and in a submuscular plane just above periosteum. This allowed the formation of a free nasalis muscle axial pedicle (based on the angular artery) which was still attached to the actual cutaneous flap, increasing its mobility and vascular viability. Hemostasis was obtained with pinpoint electrocoagulation. The flap was mobilized into position and the pivotal anchor points positioned and stabilized with buried interrupted sutures. Subcutaneous and dermal tissues were closed in a multilayered fashion with sutures. Tissue redundancies were excised, and the epidermal edges were apposed without significant tension and sutured with sutures.
Information: Selecting Yes will display possible errors in your note based on the variables you have selected. This validation is only offered as a suggestion for you. PLEASE NOTE THAT THE VALIDATION TEXT WILL BE REMOVED WHEN YOU FINALIZE YOUR NOTE. IF YOU WANT TO FAX A PRELIMINARY NOTE YOU WILL NEED TO TOGGLE THIS TO 'NO' IF YOU DO NOT WANT IT IN YOUR FAXED NOTE.
Epidermal Sutures: 4-0 Prolene
Anesthesia Volume In Cc: 3
Composite Graft Text: The defect edges were debeveled with a #15 scalpel blade.  Given the location of the defect, shape of the defect, the proximity to free margins and the fact the defect was full thickness a composite graft was deemed most appropriate.  The defect was outline and then transferred to the donor site.  A full thickness graft was then excised from the donor site. The graft was then placed in the primary defect, oriented appropriately and then sutured into place.  The secondary defect was then repaired using a primary closure.
Complex Repair And Epidermal Autograft Text: The defect edges were debeveled with a #15 scalpel blade.  The primary defect was closed partially with a complex linear closure.  Given the location of the defect, shape of the defect and the proximity to free margins an epidermal autograft was deemed most appropriate to repair the remaining defect.  The graft was trimmed to fit the size of the remaining defect.  The graft was then placed in the primary defect, oriented appropriately, and sutured into place.
Posterior Auricular Interpolation Flap Text: A decision was made to reconstruct the defect utilizing an interpolation axial flap and a staged reconstruction.  A telfa template was made of the defect.  This telfa template was then used to outline the posterior auricular interpolation flap.  The donor area for the pedicle flap was then injected with anesthesia.  The flap was excised through the skin and subcutaneous tissue down to the layer of the underlying musculature.  The pedicle flap was carefully excised within this deep plane to maintain its blood supply.  The edges of the donor site were undermined.   The donor site was closed in a primary fashion.  The pedicle was then rotated into position and sutured.  Once the tube was sutured into place, adequate blood supply was confirmed with blanching and refill.  The pedicle was then wrapped with xeroform gauze and dressed appropriately with a telfa and gauze bandage to ensure continued blood supply and protect the attached pedicle.
Anesthesia Type: 2% lidocaine with epinephrine
Rotation Flap Text: The defect edges were debeveled with a #15 scalpel blade.  Given the location of the defect, shape of the defect and the proximity to free margins a rotation flap was deemed most appropriate.  Using a sterile surgical marker, an appropriate rotation flap was drawn incorporating the defect and placing the expected incisions within the relaxed skin tension lines where possible.    The area thus outlined was incised deep to adipose tissue with a #15 scalpel blade.  The skin margins were undermined to an appropriate distance in all directions utilizing iris scissors.
Complex Repair And Melolabial Flap Text: The defect edges were debeveled with a #15 scalpel blade.  The primary defect was closed partially with a complex linear closure.  Given the location of the remaining defect, shape of the defect and the proximity to free margins a melolabial flap was deemed most appropriate for complete closure of the defect.  Using a sterile surgical marker, an appropriate advancement flap was drawn incorporating the defect and placing the expected incisions within the relaxed skin tension lines where possible.    The area thus outlined was incised deep to adipose tissue with a #15 scalpel blade.  The skin margins were undermined to an appropriate distance in all directions utilizing iris scissors.
Detail Level: Detailed
O-T Plasty Text: The defect edges were debeveled with a #15 scalpel blade.  Given the location of the defect, shape of the defect and the proximity to free margins an O-T plasty was deemed most appropriate.  Using a sterile surgical marker, an appropriate O-T plasty was drawn incorporating the defect and placing the expected incisions within the relaxed skin tension lines where possible.    The area thus outlined was incised deep to adipose tissue with a #15 scalpel blade.  The skin margins were undermined to an appropriate distance in all directions utilizing iris scissors.
Length To Time In Minutes Device Was In Place: 10
Burow's Advancement Flap Text: The defect edges were debeveled with a #15 scalpel blade.  Given the location of the defect and the proximity to free margins a Burow's advancement flap was deemed most appropriate.  Using a sterile surgical marker, the appropriate advancement flap was drawn incorporating the defect and placing the expected incisions within the relaxed skin tension lines where possible.    The area thus outlined was incised deep to adipose tissue with a #15 scalpel blade.  The skin margins were undermined to an appropriate distance in all directions utilizing iris scissors.
Complex Repair And Single Advancement Flap Text: The defect edges were debeveled with a #15 scalpel blade.  The primary defect was closed partially with a complex linear closure.  Given the location of the remaining defect, shape of the defect and the proximity to free margins a single advancement flap was deemed most appropriate for complete closure of the defect.  Using a sterile surgical marker, an appropriate advancement flap was drawn incorporating the defect and placing the expected incisions within the relaxed skin tension lines where possible.    The area thus outlined was incised deep to adipose tissue with a #15 scalpel blade.  The skin margins were undermined to an appropriate distance in all directions utilizing iris scissors.
Dorsal Nasal Flap Text: The defect edges were debeveled with a #15 scalpel blade.  Given the location of the defect and the proximity to free margins a dorsal nasal flap was deemed most appropriate.  Using a sterile surgical marker, an appropriate dorsal nasal flap was drawn around the defect.    The area thus outlined was incised deep to adipose tissue with a #15 scalpel blade.  The skin margins were undermined to an appropriate distance in all directions utilizing iris scissors.
Excisional Biopsy Additional Text (Leave Blank If You Do Not Want): The margin was drawn around the clinically apparent lesion. An elliptical shape was then drawn on the skin incorporating the lesion and margins.  Incisions were then made along these lines to the appropriate tissue plane and the lesion was extirpated.
Dressing: dry sterile dressing
Excision Method: Slit
Helical Rim Advancement Flap Text: The defect edges were debeveled with a #15 blade scalpel.  Given the location of the defect and the proximity to free margins (helical rim) a double helical rim advancement flap was deemed most appropriate.  Using a sterile surgical marker, the appropriate advancement flaps were drawn incorporating the defect and placing the expected incisions between the helical rim and antihelix where possible.  The area thus outlined was incised through and through with a #15 scalpel blade.  With a skin hook and iris scissors, the flaps were gently and sharply undermined and freed up.
Cartilage Graft Text: The defect edges were debeveled with a #15 scalpel blade.  Given the location of the defect, shape of the defect, the fact the defect involved a full thickness cartilage defect a cartilage graft was deemed most appropriate.  An appropriate donor site was identified, cleansed, and anesthetized. The cartilage graft was then harvested and transferred to the recipient site, oriented appropriately and then sutured into place.  The secondary defect was then repaired using a primary closure.
Post-Care Instructions: I reviewed with the patient in detail post-care instructions. Patient is not to engage in any heavy lifting, exercise, or swimming for the next 14 days. Should the patient develop any fevers, chills, bleeding, severe pain patient will contact the office immediately.
O-Z Flap Text: The defect edges were debeveled with a #15 scalpel blade.  Given the location of the defect, shape of the defect and the proximity to free margins an O-Z flap was deemed most appropriate.  Using a sterile surgical marker, an appropriate transposition flap was drawn incorporating the defect and placing the expected incisions within the relaxed skin tension lines where possible. The area thus outlined was incised deep to adipose tissue with a #15 scalpel blade.  The skin margins were undermined to an appropriate distance in all directions utilizing iris scissors.
Z Plasty Text: The lesion was extirpated to the level of the fat with a #15 scalpel blade.  Given the location of the defect, shape of the defect and the proximity to free margins a Z-plasty was deemed most appropriate for repair.  Using a sterile surgical marker, the appropriate transposition arms of the Z-plasty were drawn incorporating the defect and placing the expected incisions within the relaxed skin tension lines where possible.    The area thus outlined was incised deep to adipose tissue with a #15 scalpel blade.  The skin margins were undermined to an appropriate distance in all directions utilizing iris scissors.  The opposing transposition arms were then transposed into place in opposite direction and anchored with interrupted buried subcutaneous sutures.
Medical Necessity Information: It is in your best interest to select a reason for this procedure from the list below. All of these items fulfill various CMS LCD requirements except lesion extends to a margin.
Advancement Flap (Double) Text: The defect edges were debeveled with a #15 scalpel blade.  Given the location of the defect and the proximity to free margins a double advancement flap was deemed most appropriate.  Using a sterile surgical marker, the appropriate advancement flaps were drawn incorporating the defect and placing the expected incisions within the relaxed skin tension lines where possible.    The area thus outlined was incised deep to adipose tissue with a #15 scalpel blade.  The skin margins were undermined to an appropriate distance in all directions utilizing iris scissors.
Complex Repair And Double M Plasty Text: The defect edges were debeveled with a #15 scalpel blade.  The primary defect was closed partially with a complex linear closure.  Given the location of the remaining defect, shape of the defect and the proximity to free margins a double M plasty was deemed most appropriate for complete closure of the defect.  Using a sterile surgical marker, an appropriate advancement flap was drawn incorporating the defect and placing the expected incisions within the relaxed skin tension lines where possible.    The area thus outlined was incised deep to adipose tissue with a #15 scalpel blade.  The skin margins were undermined to an appropriate distance in all directions utilizing iris scissors.
Graft Donor Site Bandage (Optional-Leave Blank If You Don't Want In Note): Steri-strips and a pressure bandage were applied to the donor site.
Keystone Flap Text: The defect edges were debeveled with a #15 scalpel blade.  Given the location of the defect, shape of the defect a keystone flap was deemed most appropriate.  Using a sterile surgical marker, an appropriate keystone flap was drawn incorporating the defect, outlining the appropriate donor tissue and placing the expected incisions within the relaxed skin tension lines where possible. The area thus outlined was incised deep to adipose tissue with a #15 scalpel blade.  The skin margins were undermined to an appropriate distance in all directions around the primary defect and laterally outward around the flap utilizing iris scissors.
Trilobed Flap Text: The defect edges were debeveled with a #15 scalpel blade.  Given the location of the defect and the proximity to free margins a trilobed flap was deemed most appropriate.  Using a sterile surgical marker, an appropriate trilobed flap drawn around the defect.    The area thus outlined was incised deep to adipose tissue with a #15 scalpel blade.  The skin margins were undermined to an appropriate distance in all directions utilizing iris scissors.
Slit Excision Additional Text (Leave Blank If You Do Not Want): A linear line was drawn on the skin overlying the lesion. An incision was made slowly until the lesion was visualized.  Once visualized, the lesion was removed with blunt dissection.
Complex Repair And Bilobe Flap Text: The defect edges were debeveled with a #15 scalpel blade.  The primary defect was closed partially with a complex linear closure.  Given the location of the remaining defect, shape of the defect and the proximity to free margins a bilobe flap was deemed most appropriate for complete closure of the defect.  Using a sterile surgical marker, an appropriate advancement flap was drawn incorporating the defect and placing the expected incisions within the relaxed skin tension lines where possible.    The area thus outlined was incised deep to adipose tissue with a #15 scalpel blade.  The skin margins were undermined to an appropriate distance in all directions utilizing iris scissors.
Bi-Rhombic Flap Text: The defect edges were debeveled with a #15 scalpel blade.  Given the location of the defect and the proximity to free margins a bi-rhombic flap was deemed most appropriate.  Using a sterile surgical marker, an appropriate rhombic flap was drawn incorporating the defect. The area thus outlined was incised deep to adipose tissue with a #15 scalpel blade.  The skin margins were undermined to an appropriate distance in all directions utilizing iris scissors.
Purse String (Simple) Text: Given the location of the defect and the characteristics of the surrounding skin a purse string simple closure was deemed most appropriate.  Undermining was performed circumferentially around the surgical defect.  A purse string suture was then placed and tightened.
Rhomboid Transposition Flap Text: The defect edges were debeveled with a #15 scalpel blade.  Given the location of the defect and the proximity to free margins a rhomboid transposition flap was deemed most appropriate.  Using a sterile surgical marker, an appropriate rhomboid flap was drawn incorporating the defect.    The area thus outlined was incised deep to adipose tissue with a #15 scalpel blade.  The skin margins were undermined to an appropriate distance in all directions utilizing iris scissors.
Saucerization Excision Additional Text (Leave Blank If You Do Not Want): The margin was drawn around the clinically apparent lesion.  Incisions were then made along these lines, in a tangential fashion, to the appropriate tissue plane and the lesion was extirpated.
Purse String (Intermediate) Text: Given the location of the defect and the characteristics of the surrounding skin a purse string intermediate closure was deemed most appropriate.  Undermining was performed circumferentially around the surgical defect.  A purse string suture was then placed and tightened.
Surgeon Performing Repair (Optional): Josefa Gutierrez PA-C
Excision Depth: adipose tissue
Hemigard Postcare Instructions: The HEMIGARD strips are to remain completely dry for at least 5-7 days.
Mastoid Interpolation Flap Text: A decision was made to reconstruct the defect utilizing an interpolation axial flap and a staged reconstruction.  A telfa template was made of the defect.  This telfa template was then used to outline the mastoid interpolation flap.  The donor area for the pedicle flap was then injected with anesthesia.  The flap was excised through the skin and subcutaneous tissue down to the layer of the underlying musculature.  The pedicle flap was carefully excised within this deep plane to maintain its blood supply.  The edges of the donor site were undermined.   The donor site was closed in a primary fashion.  The pedicle was then rotated into position and sutured.  Once the tube was sutured into place, adequate blood supply was confirmed with blanching and refill.  The pedicle was then wrapped with xeroform gauze and dressed appropriately with a telfa and gauze bandage to ensure continued blood supply and protect the attached pedicle.
Hatchet Flap Text: The defect edges were debeveled with a #15 scalpel blade.  Given the location of the defect, shape of the defect and the proximity to free margins a hatchet flap was deemed most appropriate.  Using a sterile surgical marker, an appropriate hatchet flap was drawn incorporating the defect and placing the expected incisions within the relaxed skin tension lines where possible.    The area thus outlined was incised deep to adipose tissue with a #15 scalpel blade.  The skin margins were undermined to an appropriate distance in all directions utilizing iris scissors.
W Plasty Text: The lesion was extirpated to the level of the fat with a #15 scalpel blade.  Given the location of the defect, shape of the defect and the proximity to free margins a W-plasty was deemed most appropriate for repair.  Using a sterile surgical marker, the appropriate transposition arms of the W-plasty were drawn incorporating the defect and placing the expected incisions within the relaxed skin tension lines where possible.    The area thus outlined was incised deep to adipose tissue with a #15 scalpel blade.  The skin margins were undermined to an appropriate distance in all directions utilizing iris scissors.  The opposing transposition arms were then transposed into place in opposite direction and anchored with interrupted buried subcutaneous sutures.
O-L Flap Text: The defect edges were debeveled with a #15 scalpel blade.  Given the location of the defect, shape of the defect and the proximity to free margins an O-L flap was deemed most appropriate.  Using a sterile surgical marker, an appropriate advancement flap was drawn incorporating the defect and placing the expected incisions within the relaxed skin tension lines where possible.    The area thus outlined was incised deep to adipose tissue with a #15 scalpel blade.  The skin margins were undermined to an appropriate distance in all directions utilizing iris scissors.
Estimated Blood Loss (Cc): minimal
Complex Repair And Split-Thickness Skin Graft Text: The defect edges were debeveled with a #15 scalpel blade.  The primary defect was closed partially with a complex linear closure.  Given the location of the defect, shape of the defect and the proximity to free margins a split thickness skin graft was deemed most appropriate to repair the remaining defect.  The graft was trimmed to fit the size of the remaining defect.  The graft was then placed in the primary defect, oriented appropriately, and sutured into place.
Island Pedicle Flap-Requiring Vessel Identification Text: The defect edges were debeveled with a #15 scalpel blade.  Given the location of the defect, shape of the defect and the proximity to free margins an island pedicle advancement flap was deemed most appropriate.  Using a sterile surgical marker, an appropriate advancement flap was drawn, based on the axial vessel mentioned above, incorporating the defect, outlining the appropriate donor tissue and placing the expected incisions within the relaxed skin tension lines where possible.    The area thus outlined was incised deep to adipose tissue with a #15 scalpel blade.  The skin margins were undermined to an appropriate distance in all directions around the primary defect and laterally outward around the island pedicle utilizing iris scissors.  There was minimal undermining beneath the pedicle flap.
Complex Repair And M Plasty Text: The defect edges were debeveled with a #15 scalpel blade.  The primary defect was closed partially with a complex linear closure.  Given the location of the remaining defect, shape of the defect and the proximity to free margins an M plasty was deemed most appropriate for complete closure of the defect.  Using a sterile surgical marker, an appropriate advancement flap was drawn incorporating the defect and placing the expected incisions within the relaxed skin tension lines where possible.    The area thus outlined was incised deep to adipose tissue with a #15 scalpel blade.  The skin margins were undermined to an appropriate distance in all directions utilizing iris scissors.
Bilobed Transposition Flap Text: The defect edges were debeveled with a #15 scalpel blade.  Given the location of the defect and the proximity to free margins a bilobed transposition flap was deemed most appropriate.  Using a sterile surgical marker, an appropriate bilobe flap drawn around the defect.    The area thus outlined was incised deep to adipose tissue with a #15 scalpel blade.  The skin margins were undermined to an appropriate distance in all directions utilizing iris scissors.
Advancement Flap (Single) Text: The defect edges were debeveled with a #15 scalpel blade.  Given the location of the defect and the proximity to free margins a single advancement flap was deemed most appropriate.  Using a sterile surgical marker, an appropriate advancement flap was drawn incorporating the defect and placing the expected incisions within the relaxed skin tension lines where possible.    The area thus outlined was incised deep to adipose tissue with a #15 scalpel blade.  The skin margins were undermined to an appropriate distance in all directions utilizing iris scissors.
Complex Repair And O-L Flap Text: The defect edges were debeveled with a #15 scalpel blade.  The primary defect was closed partially with a complex linear closure.  Given the location of the remaining defect, shape of the defect and the proximity to free margins an O-L flap was deemed most appropriate for complete closure of the defect.  Using a sterile surgical marker, an appropriate flap was drawn incorporating the defect and placing the expected incisions within the relaxed skin tension lines where possible.    The area thus outlined was incised deep to adipose tissue with a #15 scalpel blade.  The skin margins were undermined to an appropriate distance in all directions utilizing iris scissors.
Complex Repair And O-T Advancement Flap Text: The defect edges were debeveled with a #15 scalpel blade.  The primary defect was closed partially with a complex linear closure.  Given the location of the remaining defect, shape of the defect and the proximity to free margins an O-T advancement flap was deemed most appropriate for complete closure of the defect.  Using a sterile surgical marker, an appropriate advancement flap was drawn incorporating the defect and placing the expected incisions within the relaxed skin tension lines where possible.    The area thus outlined was incised deep to adipose tissue with a #15 scalpel blade.  The skin margins were undermined to an appropriate distance in all directions utilizing iris scissors.
No Repair - Repaired With Adjacent Surgical Defect Text (Leave Blank If You Do Not Want): After the excision the defect was repaired concurrently with another surgical defect which was in close approximation.
Xenograft Text: The defect edges were debeveled with a #15 scalpel blade.  Given the location of the defect, shape of the defect and the proximity to free margins a xenograft was deemed most appropriate.  The graft was then trimmed to fit the size of the defect.  The graft was then placed in the primary defect and oriented appropriately.
Bilobed Flap Text: The defect edges were debeveled with a #15 scalpel blade.  Given the location of the defect and the proximity to free margins a bilobe flap was deemed most appropriate.  Using a sterile surgical marker, an appropriate bilobe flap drawn around the defect.    The area thus outlined was incised deep to adipose tissue with a #15 scalpel blade.  The skin margins were undermined to an appropriate distance in all directions utilizing iris scissors.
Eliptical Excision Additional Text (Leave Blank If You Do Not Want): The margin was drawn around the clinically apparent lesion.  An elliptical shape was then drawn on the skin incorporating the lesion and margins.  Incisions were then made along these lines to the appropriate tissue plane and the lesion was extirpated.
Rhombic Flap Text: The defect edges were debeveled with a #15 scalpel blade.  Given the location of the defect and the proximity to free margins a rhombic flap was deemed most appropriate.  Using a sterile surgical marker, an appropriate rhombic flap was drawn incorporating the defect.    The area thus outlined was incised deep to adipose tissue with a #15 scalpel blade.  The skin margins were undermined to an appropriate distance in all directions utilizing iris scissors.
Hemigard Intro: Due to skin fragility and wound tension, it was decided to use HEMIGARD adhesive retention suture devices to permit a linear closure. The skin was cleaned and dried for a 6cm distance away from the wound. Excessive hair, if present, was removed to allow for adhesion.
Nostril Rim Text: The closure involved the nostril rim.
Burow's Graft Text: The defect edges were debeveled with a #15 scalpel blade.  Given the location of the defect, shape of the defect, the proximity to free margins and the presence of a standing cone deformity a Burow's skin graft was deemed most appropriate. The standing cone was removed and this tissue was then trimmed to the shape of the primary defect. The adipose tissue was also removed until only dermis and epidermis were left.  The skin margins of the secondary defect were undermined to an appropriate distance in all directions utilizing iris scissors.  The secondary defect was closed with interrupted buried subcutaneous sutures.  The skin edges were then re-apposed with running  sutures.  The skin graft was then placed in the primary defect and oriented appropriately.
Nasal Turnover Hinge Flap Text: The defect edges were debeveled with a #15 scalpel blade.  Given the size, depth, location of the defect and the defect being full thickness a nasal turnover hinge flap was deemed most appropriate.  Using a sterile surgical marker, an appropriate hinge flap was drawn incorporating the defect. The area thus outlined was incised with a #15 scalpel blade. The flap was designed to recreate the nasal mucosal lining and the alar rim. The skin margins were undermined to an appropriate distance in all directions utilizing iris scissors.
Melolabial Interpolation Flap Text: A decision was made to reconstruct the defect utilizing an interpolation axial flap and a staged reconstruction.  A telfa template was made of the defect.  This telfa template was then used to outline the melolabial interpolation flap.  The donor area for the pedicle flap was then injected with anesthesia.  The flap was excised through the skin and subcutaneous tissue down to the layer of the underlying musculature.  The pedicle flap was carefully excised within this deep plane to maintain its blood supply.  The edges of the donor site were undermined.   The donor site was closed in a primary fashion.  The pedicle was then rotated into position and sutured.  Once the tube was sutured into place, adequate blood supply was confirmed with blanching and refill.  The pedicle was then wrapped with xeroform gauze and dressed appropriately with a telfa and gauze bandage to ensure continued blood supply and protect the attached pedicle.
Complex Repair And Burow's Graft Text: The defect edges were debeveled with a #15 scalpel blade.  The primary defect was closed partially with a complex linear closure.  Given the location of the defect, shape of the defect, the proximity to free margins and the presence of a standing cone deformity a Burow's graft was deemed most appropriate to repair the remaining defect.  The graft was trimmed to fit the size of the remaining defect.  The graft was then placed in the primary defect, oriented appropriately, and sutured into place.
Mucosal Advancement Flap Text: Given the location of the defect, shape of the defect and the proximity to free margins a mucosal advancement flap was deemed most appropriate. Incisions were made with a 15 blade scalpel in the appropriate fashion along the cutaneous vermillion border and the mucosal lip. The remaining actinically damaged mucosal tissue was excised.  The mucosal advancement flap was then elevated to the gingival sulcus with care taken to preserve the neurovascular structures and advanced into the primary defect. Care was taken to ensure that precise realignment of the vermilion border was achieved.
H Plasty Text: Given the location of the defect, shape of the defect and the proximity to free margins a H-plasty was deemed most appropriate for repair.  Using a sterile surgical marker, the appropriate advancement arms of the H-plasty were drawn incorporating the defect and placing the expected incisions within the relaxed skin tension lines where possible. The area thus outlined was incised deep to adipose tissue with a #15 scalpel blade. The skin margins were undermined to an appropriate distance in all directions utilizing iris scissors.  The opposing advancement arms were then advanced into place in opposite direction and anchored with interrupted buried subcutaneous sutures.
Complex Repair And V-Y Plasty Text: The defect edges were debeveled with a #15 scalpel blade.  The primary defect was closed partially with a complex linear closure.  Given the location of the remaining defect, shape of the defect and the proximity to free margins a V-Y plasty was deemed most appropriate for complete closure of the defect.  Using a sterile surgical marker, an appropriate advancement flap was drawn incorporating the defect and placing the expected incisions within the relaxed skin tension lines where possible.    The area thus outlined was incised deep to adipose tissue with a #15 scalpel blade.  The skin margins were undermined to an appropriate distance in all directions utilizing iris scissors.
Double Island Pedicle Flap Text: The defect edges were debeveled with a #15 scalpel blade.  Given the location of the defect, shape of the defect and the proximity to free margins a double island pedicle advancement flap was deemed most appropriate.  Using a sterile surgical marker, an appropriate advancement flap was drawn incorporating the defect, outlining the appropriate donor tissue and placing the expected incisions within the relaxed skin tension lines where possible.    The area thus outlined was incised deep to adipose tissue with a #15 scalpel blade.  The skin margins were undermined to an appropriate distance in all directions around the primary defect and laterally outward around the island pedicle utilizing iris scissors.  There was minimal undermining beneath the pedicle flap.
O-T Advancement Flap Text: The defect edges were debeveled with a #15 scalpel blade.  Given the location of the defect, shape of the defect and the proximity to free margins an O-T advancement flap was deemed most appropriate.  Using a sterile surgical marker, an appropriate advancement flap was drawn incorporating the defect and placing the expected incisions within the relaxed skin tension lines where possible.    The area thus outlined was incised deep to adipose tissue with a #15 scalpel blade.  The skin margins were undermined to an appropriate distance in all directions utilizing iris scissors.
A-T Advancement Flap Text: The defect edges were debeveled with a #15 scalpel blade.  Given the location of the defect, shape of the defect and the proximity to free margins an A-T advancement flap was deemed most appropriate.  Using a sterile surgical marker, an appropriate advancement flap was drawn incorporating the defect and placing the expected incisions within the relaxed skin tension lines where possible.    The area thus outlined was incised deep to adipose tissue with a #15 scalpel blade.  The skin margins were undermined to an appropriate distance in all directions utilizing iris scissors.
Suture Removal: 7 days
Complex Repair And Transposition Flap Text: The defect edges were debeveled with a #15 scalpel blade.  The primary defect was closed partially with a complex linear closure.  Given the location of the remaining defect, shape of the defect and the proximity to free margins a transposition flap was deemed most appropriate for complete closure of the defect.  Using a sterile surgical marker, an appropriate advancement flap was drawn incorporating the defect and placing the expected incisions within the relaxed skin tension lines where possible.    The area thus outlined was incised deep to adipose tissue with a #15 scalpel blade.  The skin margins were undermined to an appropriate distance in all directions utilizing iris scissors.
V-Y Plasty Text: The defect edges were debeveled with a #15 scalpel blade.  Given the location of the defect, shape of the defect and the proximity to free margins an V-Y advancement flap was deemed most appropriate.  Using a sterile surgical marker, an appropriate advancement flap was drawn incorporating the defect and placing the expected incisions within the relaxed skin tension lines where possible.    The area thus outlined was incised deep to adipose tissue with a #15 scalpel blade.  The skin margins were undermined to an appropriate distance in all directions utilizing iris scissors.
Repair Performed By Another Provider Text (Leave Blank If You Do Not Want): After the tissue was excised the defect was repaired by another provider.
Alar Island Pedicle Flap Text: The defect edges were debeveled with a #15 scalpel blade.  Given the location of the defect, shape of the defect and the proximity to the alar rim an island pedicle advancement flap was deemed most appropriate.  Using a sterile surgical marker, an appropriate advancement flap was drawn incorporating the defect, outlining the appropriate donor tissue and placing the expected incisions within the nasal ala running parallel to the alar rim. The area thus outlined was incised with a #15 scalpel blade.  The skin margins were undermined minimally to an appropriate distance in all directions around the primary defect and laterally outward around the island pedicle utilizing iris scissors.  There was minimal undermining beneath the pedicle flap.
Fusiform Excision Additional Text (Leave Blank If You Do Not Want): The margin was drawn around the clinically apparent lesion.  A fusiform shape was then drawn on the skin incorporating the lesion and margins.  Incisions were then made along these lines to the appropriate tissue plane and the lesion was extirpated.
Tissue Cultured Epidermal Autograft Text: The defect edges were debeveled with a #15 scalpel blade.  Given the location of the defect, shape of the defect and the proximity to free margins a tissue cultured epidermal autograft was deemed most appropriate.  The graft was then trimmed to fit the size of the defect.  The graft was then placed in the primary defect and oriented appropriately.
Complex Repair And A-T Advancement Flap Text: The defect edges were debeveled with a #15 scalpel blade.  The primary defect was closed partially with a complex linear closure.  Given the location of the remaining defect, shape of the defect and the proximity to free margins an A-T advancement flap was deemed most appropriate for complete closure of the defect.  Using a sterile surgical marker, an appropriate advancement flap was drawn incorporating the defect and placing the expected incisions within the relaxed skin tension lines where possible.    The area thus outlined was incised deep to adipose tissue with a #15 scalpel blade.  The skin margins were undermined to an appropriate distance in all directions utilizing iris scissors.
Banner Transposition Flap Text: The defect edges were debeveled with a #15 scalpel blade.  Given the location of the defect and the proximity to free margins a Banner transposition flap was deemed most appropriate.  Using a sterile surgical marker, an appropriate flap drawn around the defect. The area thus outlined was incised deep to adipose tissue with a #15 scalpel blade.  The skin margins were undermined to an appropriate distance in all directions utilizing iris scissors.
Melolabial Transposition Flap Text: The defect edges were debeveled with a #15 scalpel blade.  Given the location of the defect and the proximity to free margins a melolabial flap was deemed most appropriate.  Using a sterile surgical marker, an appropriate melolabial transposition flap was drawn incorporating the defect.    The area thus outlined was incised deep to adipose tissue with a #15 scalpel blade.  The skin margins were undermined to an appropriate distance in all directions utilizing iris scissors.
Size Of Lesion In Cm: 1.5
Suturegard Body: The suture ends were repeatedly re-tightened and re-clamped to achieve the desired tissue expansion.
Complex Repair And Skin Substitute Graft Text: The defect edges were debeveled with a #15 scalpel blade.  The primary defect was closed partially with a complex linear closure.  Given the location of the remaining defect, shape of the defect and the proximity to free margins a skin substitute graft was deemed most appropriate to repair the remaining defect.  The graft was trimmed to fit the size of the remaining defect.  The graft was then placed in the primary defect, oriented appropriately, and sutured into place.
Split-Thickness Skin Graft Text: The defect edges were debeveled with a #15 scalpel blade.  Given the location of the defect, shape of the defect and the proximity to free margins a split thickness skin graft was deemed most appropriate.  Using a sterile surgical marker, the primary defect shape was transferred to the donor site. The split thickness graft was then harvested.  The skin graft was then placed in the primary defect and oriented appropriately.
Undermining Type: Entire Wound
Muscle Hinge Flap Text: The defect edges were debeveled with a #15 scalpel blade.  Given the size, depth and location of the defect and the proximity to free margins a muscle hinge flap was deemed most appropriate.  Using a sterile surgical marker, an appropriate hinge flap was drawn incorporating the defect. The area thus outlined was incised with a #15 scalpel blade.  The skin margins were undermined to an appropriate distance in all directions utilizing iris scissors.
Complex Repair And Ftsg Text: The defect edges were debeveled with a #15 scalpel blade.  The primary defect was closed partially with a complex linear closure.  Given the location of the defect, shape of the defect and the proximity to free margins a full thickness skin graft was deemed most appropriate to repair the remaining defect.  The graft was trimmed to fit the size of the remaining defect.  The graft was then placed in the primary defect, oriented appropriately, and sutured into place.
Interpolation Flap Text: A decision was made to reconstruct the defect utilizing an interpolation axial flap and a staged reconstruction.  A telfa template was made of the defect.  This telfa template was then used to outline the interpolation flap.  The donor area for the pedicle flap was then injected with anesthesia.  The flap was excised through the skin and subcutaneous tissue down to the layer of the underlying musculature.  The interpolation flap was carefully excised within this deep plane to maintain its blood supply.  The edges of the donor site were undermined.   The donor site was closed in a primary fashion.  The pedicle was then rotated into position and sutured.  Once the tube was sutured into place, adequate blood supply was confirmed with blanching and refill.  The pedicle was then wrapped with xeroform gauze and dressed appropriately with a telfa and gauze bandage to ensure continued blood supply and protect the attached pedicle.
Modified Advancement Flap Text: The defect edges were debeveled with a #15 scalpel blade.  Given the location of the defect, shape of the defect and the proximity to free margins a modified advancement flap was deemed most appropriate.  Using a sterile surgical marker, an appropriate advancement flap was drawn incorporating the defect and placing the expected incisions within the relaxed skin tension lines where possible.    The area thus outlined was incised deep to adipose tissue with a #15 scalpel blade.  The skin margins were undermined to an appropriate distance in all directions utilizing iris scissors.

## 2021-06-16 NOTE — PROCEDURE: COUNSELING
Doxycycline Pregnancy And Lactation Text: This medication is Pregnancy Category D and not consider safe during pregnancy. It is also excreted in breast milk but is considered safe for shorter treatment courses.
Isotretinoin Counseling: Patient should get monthly blood tests, not donate blood, not drive at night if vision affected, not share medication, and not undergo elective surgery for 6 months after tx completed. Side effects reviewed, pt to contact office should one occur.
Bactrim Pregnancy And Lactation Text: This medication is Pregnancy Category D and is known to cause fetal risk.  It is also excreted in breast milk.
High Dose Vitamin A Pregnancy And Lactation Text: High dose vitamin A therapy is contraindicated during pregnancy and breast feeding.
Detail Level: Detailed
Topical Retinoid counseling:  Patient advised to apply a pea-sized amount only at bedtime and wait 30 minutes after washing their face before applying.  If too drying, patient may add a non-comedogenic moisturizer. The patient verbalized understanding of the proper use and possible adverse effects of retinoids.  All of the patient's questions and concerns were addressed.
Birth Control Pills Counseling: Birth Control Pill Counseling: I discussed with the patient the potential side effects of OCPs including but not limited to increased risk of stroke, heart attack, thrombophlebitis, deep venous thrombosis, hepatic adenomas, breast changes, GI upset, headaches, and depression.  The patient verbalized understanding of the proper use and possible adverse effects of OCPs. All of the patient's questions and concerns were addressed.
Sarecycline Pregnancy And Lactation Text: This medication is Pregnancy Category D and not consider safe during pregnancy. It is also excreted in breast milk.
Isotretinoin Pregnancy And Lactation Text: This medication is Pregnancy Category X and is considered extremely dangerous during pregnancy. It is unknown if it is excreted in breast milk.
Azithromycin Pregnancy And Lactation Text: This medication is considered safe during pregnancy and is also secreted in breast milk.
Spironolactone Counseling: Patient advised regarding risks of diarrhea, abdominal pain, hyperkalemia, birth defects (for female patients), liver toxicity and renal toxicity. The patient may need blood work to monitor liver and kidney function and potassium levels while on therapy. The patient verbalized understanding of the proper use and possible adverse effects of spironolactone.  All of the patient's questions and concerns were addressed.
High Dose Vitamin A Counseling: Side effects reviewed, pt to contact office should one occur.
Detail Level: Zone
Erythromycin Counseling:  I discussed with the patient the risks of erythromycin including but not limited to GI upset, allergic reaction, drug rash, diarrhea, increase in liver enzymes, and yeast infections.
Benzoyl Peroxide Pregnancy And Lactation Text: This medication is Pregnancy Category C. It is unknown if benzoyl peroxide is excreted in breast milk.
Spironolactone Pregnancy And Lactation Text: This medication can cause feminization of the male fetus and should be avoided during pregnancy. The active metabolite is also found in breast milk.
Dapsone Pregnancy And Lactation Text: This medication is Pregnancy Category C and is not considered safe during pregnancy or breast feeding.
Sarecycline Counseling: Patient advised regarding possible photosensitivity and discoloration of the teeth, skin, lips, tongue and gums.  Patient instructed to avoid sunlight, if possible.  When exposed to sunlight, patients should wear protective clothing, sunglasses, and sunscreen.  The patient was instructed to call the office immediately if the following severe adverse effects occur:  hearing changes, easy bruising/bleeding, severe headache, or vision changes.  The patient verbalized understanding of the proper use and possible adverse effects of sarecycline.  All of the patient's questions and concerns were addressed.
Benzoyl Peroxide Counseling: Patient counseled that medicine may cause skin irritation and bleach clothing.  In the event of skin irritation, the patient was advised to reduce the amount of the drug applied or use it less frequently.   The patient verbalized understanding of the proper use and possible adverse effects of benzoyl peroxide.  All of the patient's questions and concerns were addressed.
Azithromycin Counseling:  I discussed with the patient the risks of azithromycin including but not limited to GI upset, allergic reaction, drug rash, diarrhea, and yeast infections.
Birth Control Pills Pregnancy And Lactation Text: This medication should be avoided if pregnant and for the first 30 days post-partum.
Erythromycin Pregnancy And Lactation Text: This medication is Pregnancy Category B and is considered safe during pregnancy. It is also excreted in breast milk.
Doxycycline Counseling:  Patient counseled regarding possible photosensitivity and increased risk for sunburn.  Patient instructed to avoid sunlight, if possible.  When exposed to sunlight, patients should wear protective clothing, sunglasses, and sunscreen.  The patient was instructed to call the office immediately if the following severe adverse effects occur:  hearing changes, easy bruising/bleeding, severe headache, or vision changes.  The patient verbalized understanding of the proper use and possible adverse effects of doxycycline.  All of the patient's questions and concerns were addressed.
Topical Retinoid Pregnancy And Lactation Text: This medication is Pregnancy Category C. It is unknown if this medication is excreted in breast milk.
Use Enhanced Medication Counseling?: No
Topical Clindamycin Pregnancy And Lactation Text: This medication is Pregnancy Category B and is considered safe during pregnancy. It is unknown if it is excreted in breast milk.
Topical Sulfur Applications Pregnancy And Lactation Text: This medication is Pregnancy Category C and has an unknown safety profile during pregnancy. It is unknown if this topical medication is excreted in breast milk.
Tazorac Pregnancy And Lactation Text: This medication is not safe during pregnancy. It is unknown if this medication is excreted in breast milk.
Topical Clindamycin Counseling: Patient counseled that this medication may cause skin irritation or allergic reactions.  In the event of skin irritation, the patient was advised to reduce the amount of the drug applied or use it less frequently.   The patient verbalized understanding of the proper use and possible adverse effects of clindamycin.  All of the patient's questions and concerns were addressed.
Bactrim Counseling:  I discussed with the patient the risks of sulfa antibiotics including but not limited to GI upset, allergic reaction, drug rash, diarrhea, dizziness, photosensitivity, and yeast infections.  Rarely, more serious reactions can occur including but not limited to aplastic anemia, agranulocytosis, methemoglobinemia, blood dyscrasias, liver or kidney failure, lung infiltrates or desquamative/blistering drug rashes.
Minocycline Counseling: Patient advised regarding possible photosensitivity and discoloration of the teeth, skin, lips, tongue and gums.  Patient instructed to avoid sunlight, if possible.  When exposed to sunlight, patients should wear protective clothing, sunglasses, and sunscreen.  The patient was instructed to call the office immediately if the following severe adverse effects occur:  hearing changes, easy bruising/bleeding, severe headache, or vision changes.  The patient verbalized understanding of the proper use and possible adverse effects of minocycline.  All of the patient's questions and concerns were addressed.
Tazorac Counseling:  Patient advised that medication is irritating and drying.  Patient may need to apply sparingly and wash off after an hour before eventually leaving it on overnight.  The patient verbalized understanding of the proper use and possible adverse effects of tazorac.  All of the patient's questions and concerns were addressed.
Dapsone Counseling: I discussed with the patient the risks of dapsone including but not limited to hemolytic anemia, agranulocytosis, rashes, methemoglobinemia, kidney failure, peripheral neuropathy, headaches, GI upset, and liver toxicity.  Patients who start dapsone require monitoring including baseline LFTs and weekly CBCs for the first month, then every month thereafter.  The patient verbalized understanding of the proper use and possible adverse effects of dapsone.  All of the patient's questions and concerns were addressed.
Tetracycline Counseling: Patient counseled regarding possible photosensitivity and increased risk for sunburn.  Patient instructed to avoid sunlight, if possible.  When exposed to sunlight, patients should wear protective clothing, sunglasses, and sunscreen.  The patient was instructed to call the office immediately if the following severe adverse effects occur:  hearing changes, easy bruising/bleeding, severe headache, or vision changes.  The patient verbalized understanding of the proper use and possible adverse effects of tetracycline.  All of the patient's questions and concerns were addressed. Patient understands to avoid pregnancy while on therapy due to potential birth defects.
Topical Sulfur Applications Counseling: Topical Sulfur Counseling: Patient counseled that this medication may cause skin irritation or allergic reactions.  In the event of skin irritation, the patient was advised to reduce the amount of the drug applied or use it less frequently.   The patient verbalized understanding of the proper use and possible adverse effects of topical sulfur application.  All of the patient's questions and concerns were addressed.

## 2021-06-16 NOTE — PROCEDURE: PATHOLOGY BILLING
Immunohistochemistry ([id number] and [id number]) billing is not performed here. Please use the Immunohistochemistry Stain Billing plan to accomplish this. Immunohistochemistry ([id number] and [id number]) billing is not performed here. Please use the Immunohistochemistry Stain Billing plan to accomplish this.

## 2021-06-22 NOTE — PROGRESS NOTES
Assessment/plan   Braxton Kilgore is a 38 y.o. female who is new to my practice. (previously a pt with [facility]- merging charts)    Braxton was seen today for establish care and conjunctivitis.    Diagnoses and all orders for this visit:    Irritable bowel syndrome, unspecified type.  She describes long-standing history of IBS as diagnosed by [facility].  It sounds like both constipation and diarrhea can bother her but primarily the abdominal pain complaint.  No red flag symptoms so at this time we will not proceed with further workup.  I did talk at length about dietary approaches for her IBS and recommended that she explore the low FODMAP approach.  She will look into this and request a referral to a dietitian should she desire this further before her next visit.  Her PHQ 9 score is 0 today.  Because amitriptyline has worked for her in the past, I do feel like this is a reasonable option for her to continue on especially in light of possible anxiety component.  I restarted at low-dose 10 mg, though she states potentially in the past she was up to 25 mg p.o. We will reevaluate dose in about 1-2 months.   -     amitriptyline (ELAVIL) 10 MG tablet; Take 1 tablet (10 mg total) by mouth at bedtime.  -Encouraged physical activity    Conjunctivitis of both eyes. Discussed the predominating etiology is likely viral especially in light of her son's recent URI.  Recommended to use good hand hygiene and continue monitoring symptoms.  She will follow-up if these are not improving in 3-5 days.      I spent 40 minutes with the patient, >50% of which was in counseling regarding the patient's medical issues as noted above.    Follow up: 1-2 months to follow-up amitriptyline and FODMAP approach    Cecy Mills MD    Subjective:      HPI: Braxton Kilgore is a 38 y.o. female who is here for:    Chief Complaint   Patient presents with     Establish Care     IBS meds     Conjunctivitis     son recently diagnosed        Wants to  "discuss IBS medications:  Followed with Dr. Garcia at [facility] for years (we will be merging her chart from [place] name for medical hx). She describes having IBS starting in highschool. Her mom's \"whole side of the family\" has IBS. She has been to [facility] \"1000 times\". She had a normal upper EGD around 2012. No colonoscopy. [facility] convinced it was IBS.   [facility] discussed diet and exercise, which she does well and finds it to be helpful but doesn't resolve her sx  Dr. Garcia had started her on amitriptyline low dose for a few (3?) years. She found this helpful but had to stop when she was pregnant. For the past 2 years has been off this.     For her IBS sx: BMs are variable between constipation and diarrhea. she gets a LUQ abdominal cramping weekly- range of discomfort with this (mild to severe bringing her to the floor); radiates downward like a spasm. She takes immodium when it starts cramping (anywhere from 2mg to 16mg a day). Needed less when on the TCA.  She will then get nauseated and diarrhea at times. The cramps can happen any time but typically after meals. She notices sx are worse with stress also. 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     [facility] gave her something dissolvable for the cramps but that didn't help at all.  She is also tried fiber, MiraLAX etc. for times when she is constipated which is helpful for those times.  No blood in stools. She and her  are more mindful with eating and working out 5days/week so sx are much improved.   No weight loss.   She actually gained \"good weight\"- muscle from working out.   She has a h/o shigella in the past (~3yr ago).     Conjunctivitis: woke up this AM with bilateral pink eyes; no drainage yet. No fevers or chills but has been having rhinorrhea, congestion.  No cough or shortness of breath.  Her son was dx with pink eye last week.     Review of Systems:  H/o " "Asherman's d/o of the uterus- miscarriage hx and follows with OB/gyn. Has had successful pregnancies as well.   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 Exercises 5x/week at [facility] [facility] [employer]. No alcohol or smoking hx.     Cecy Mills MD       Medical History:  There is no problem list on file for this patient.    Past Medical History:   Diagnosis Date     Asherman's syndrome     uterine disorder contributing to infertility     Shigella enteritis 2012    sepsis, hospitalized, major weight loss to 89lbs. Then got Cdiff     Past Surgical History:   Procedure Laterality Date     DILATION AND CURETTAGE, DIAGNOSTIC / THERAPEUTIC  2011, 2016    for miscarriages     HYSTEROSCOPY  2016    x2 for infertility evaluation     Patient has no known allergies.  Family History   Problem Relation Age of Onset     Irritable bowel syndrome Mother      Irritable bowel syndrome Maternal Aunt      Irritable bowel syndrome Maternal Uncle      Irritable bowel syndrome Maternal Grandmother      Hypertension Paternal Grandmother      Parkinsonism Paternal Grandfather        Medications:  Current Outpatient Medications   Medication Sig Dispense Refill     amitriptyline (ELAVIL) 10 MG tablet Take 1 tablet (10 mg total) by mouth at bedtime. 30 tablet 2     No current facility-administered medications for this visit.        Imaging & Labs reviewed this visit: none      Objective:      Vitals:    12/17/18 0856   BP: 108/62   Pulse: 62   Weight: 124 lb 14.4 oz (56.7 kg)   Height: 5' 5\" (1.651 m)       Physical Exam:     General: Alert, no acute distress.   HEENT: normocephalic. conjunctivae are injected bilaterally without drainage, Normal pearly TMs bilaterally without erythema, pus or fluid. Position and landmarks are " normal.  Nose is clear.  Oropharynx is moist and clear without tonsillar hypertrophy, asymmetry, exudate or lesions.   Neck: supple without adenopathy or thyromegaly.  Lungs: Good aeration bilaterally. Clear to auscultation without wheezes, rales or rhonci.   Heart: regular rate and rhythm, normal S1 and S2, no murmurs  Abdomen: soft and nontender  Skin: clear without rash or lesions  Neuro: alert, interactive moving all extremities equally, normal muscle tone in all 4 extremities, deep tendon reflexes 2+ symmetrically at the patella      Cecy Mills MD

## 2021-06-24 NOTE — PROGRESS NOTES
"Assessment/plan   Braxton Kilgore is a 38 y.o. female who is established to my practice.    Braxton was seen today for f/u IBS    Diagnoses and all orders for this visit:    Irritable bowel syndrome, unspecified type.  She describes long-standing history of IBS as diagnosed by [facility].  It sounds like both constipation and diarrhea can bother her but primarily the abdominal pain complaint.  No red flag symptoms so at this time we will not proceed with further workup.   -Symptoms are notably improved on amitriptyline 10 mg which was initiated December 2018.   -Discussed trial increase to 25 mg amitriptyline at night per patient preference as she was previously tolerating this dose years ago and feels it may continue to further improve her sx  -Declines dietitian at this time but is continuing to work on low FODMAP approach  -Continues with physical activity   -Reviewed mindfulness options  - declines counseling/therapy at this time    Motion sickness.   - Zofran ODT prescribed  - dramamine worsens IBS sx in the past      Follow up: 1-2 months to follow-up amitriptyline or via mychart to indicate dose preference    Cecy Mills MD    Subjective:      HPI: Braxton Kilgore is a 38 y.o. female who is here for:    Chief Complaint   Patient presents with     Follow-up     amitriptyline for IBS, zofran for car sickness     F/u IBS- mixed type: See my note from 12/17/2018 which documents her hx in detail.  We started amitriptyline 10 mg at that visit.  States things have been \"going really really well\" since starting the amitriptyline. Only two bad episodes of abdominal cramping (as opposed to twice weekly).   Using immodium every once in awhile.   Trying lower FODMAP foods as we discussed last visit, not specifically the full elimination diet.   Noticed sugar triggered sx. Drinking more water. Continues with her excellent exercise routine.  Feels less stressed about not feeling so awful.   Definitely notices sx are " "worse when anxious or stressed.   Family is similar with same sx.   Doing some mindfulness.     Review of her IBS- mixed type: dx in high school. She has been to [facility] \"1000 times\". She had a normal upper EGD around 2012. No colonoscopy. [facility] convinced it was IBS.     Aggregating factors: anxiety/stress, sugar  Alleviating factors: exercise  Constipation- MiraLAX, increased fiber diet do help   Diarrhea- Imodium helps      Motion sickness:  Dramamine in the past made her IBS sx worse, zofran has helped in the past. 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    Review of Systems:  Denies bloody stools.  No weight loss.     Reviewed her issue with the conjunctivitis dx in Dec- ended up needing eye drop and lack of communication from system end and she had a frustrating patient experience.     H/o Asherman's d/o of the uterus- miscarriage hx and follows with OB/gyn. Has had successful pregnancies as well.   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 Exercises 5x/week at [employer]. No alcohol or smoking hx.     Cecy Mills MD       Medical History:  Patient Active Problem List   Diagnosis     Gynecologic Services Intrauterine Device (IUD) Insertion     Torticollis     Perioral Dermatitis     Irritable Bowel Syndrome     Vitamin D Deficiency     Allergic Rhinitis     Anxiety state, unspecified     Asherman's syndrome     Recurrent pregnancy loss (CODE)     Past Medical History:   Diagnosis Date     Anemia 2005    postpartum     Anorexia      Asherman's syndrome     uterine disorder contributing to infertility     Asthma     exercise induced     Clostridium difficile colitis 9/2012     Female infertility     Ashermans syndrome     IBS " (irritable bowel syndrome)      Migraine      Shigella enteritis 2012    sepsis, hospitalized, major weight loss to 89lbs. Then got Cdiff     Shigella gastroenteritis 2012     Shingles outbreak      Past Surgical History:   Procedure Laterality Date     DILATION AND CURETTAGE OF UTERUS  2008 and 2011     DILATION AND CURETTAGE, DIAGNOSTIC / THERAPEUTIC  2011, 2016    for miscarriages     HYSTEROSCOPY  2016    x2 for infertility evaluation     Ibuprofen; Ketorolac tromethamine; and Sulfa (sulfonamide antibiotics)  Family History   Problem Relation Age of Onset     Irritable bowel syndrome Mother      Irritable bowel syndrome Maternal Aunt      Irritable bowel syndrome Maternal Uncle      Irritable bowel syndrome Maternal Grandmother      Hypertension Paternal Grandmother      Parkinsonism Paternal Grandfather      Hypertension Father        Medications:  Current Outpatient Medications   Medication Sig Dispense Refill     amitriptyline (ELAVIL) 25 MG tablet Take 1 tablet (25 mg total) by mouth at bedtime. 30 tablet 3     ondansetron (ZOFRAN ODT) 4 MG disintegrating tablet Take 1 tablet (4 mg total) by mouth every 8 (eight) hours as needed for nausea. 30 tablet 1     ondansetron (ZOFRAN) 4 MG tablet Take 1 tablet (4 mg total) by mouth every 8 (eight) hours as needed for nausea. 30 tablet 1     No current facility-administered medications for this visit.        Imaging & Labs reviewed this visit: none      Objective:      Vitals:    02/25/19 1402   BP: 106/68   Pulse: 76   Weight: 128 lb (58.1 kg)       Physical Exam:     General: Alert, no acute distress.   HEENT: normocephalic conjunctivae are clear. EOMI  Neck: supple   Lungs: Normal effort  Heart: regular rate  Abdomen: soft, nontender  Skin: clear without rash or lesions  Neuro: alert, oriented  Psych: mood good, affect appropriate, good eye contact, insight and judgment intact, normal speech pattern.    PHQ9 score No Data Recorded  Little interest or pleasure in  doing things: Not at all  Feeling down, depressed, or hopeless: Not at all    Cecy Mills MD

## 2021-06-24 NOTE — PATIENT INSTRUCTIONS - HE
"MINDFULNESS    \"Mindfulness is about being fully awake in our lives. It is about perceiving the exquisite vividness of each moment.\"    - Onel Aguirre  Mindfulness-Based Stress Reduction (MBSR) is a blend of meditation, body awareness, and yoga: learning through practice and study how your body handles (and can resolve) stress neurologically.     Mindfulness Resources:  1. \"Calm\" raoul- free trial has guided 10min sessions on anxiety, gratitude, sleep, happiness, managing stress, etc.     2. \"Insight Timer\" raoul- free meditation timer with musical or bell tones, can also stream guided meditations    3. Free MBSR program online: [email or web address]        "

## 2021-06-27 NOTE — PROGRESS NOTES
Progress Notes by Carey Rg MD at 6/17/2019  8:20 AM     Author: Carey Rg MD Service: -- Author Type: Physician    Filed: 6/17/2019  9:11 AM Encounter Date: 6/17/2019 Status: Signed    : Carey Rg MD (Physician)       FEMALE PREVENTATIVE EXAM    Assessment and Plan:     Routine general medical examination at a health care facility  -     Glucose  -     Lipid Cascade  We discussed healthy lifestyle, nutrition, cardiovascular risk reduction, self care, safety, sunscreen, seatbelt, and timing of cancer screening.  Health maintenance screening and immunizations reviewed with the patient.    Irritable bowel syndrome, unspecified type  Stable on amitriptyline      Next follow up:  One year    Immunization Review  Adult Imm Review: No immunizations due today    I discussed the following with the patient:   Adult Healthy Living: Importance of regular exercise  Healthy nutrition      Subjective:   Chief Complaint: Braxton Kilgore is an 38 y.o. female here for a preventative health visit.     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  She has forms to be completed.  She is otherwise very healthy.  She takes amitriptyline for IBS which is been working well for her in addition to healthy lifestyle modifications.  She has no questions or concerns today.  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  However, the patient stated that she has a gynecologist and has had a couple Pap smear since then.  Her last Pap smear was 2 years ago and it was normal    Healthy Habits  Are you taking a daily aspirin? No  Do you typically exercising at least 40 min, 3-4 times per week?  Yes  Do you usually eat at least 4 servings of fruit and vegetables a day, include whole grains and fiber and avoid regularly eating high fat foods?  "Yes  Have you had an eye exam in the past two years? Yes  Do you see a dentist twice per year? Yes  Do you have any concerns regarding sleep? No    Safety Screen  If you own firearms, are they secured in a locked gun cabinet or with trigger locks? The patient does not own any firearms  No data recorded    Review of Systems:  Please see above.  The rest of the review of systems are negative for all systems.     Pap History:   Yes - updated in Problem List and Health Maintenance accordingly  Cancer Screening       Status Date      PAP SMEAR Next Due 10/5/2020      Done 10/5/2015 GYNECOLOGIC CYTOLOGY (PAP SMEAR)     Patient has more history with this topic...          Patient Care Team:  Cecy Mills MD as PCP - General (Family Medicine)  Nick Garcia MD        History     Reviewed By Date/Time Sections Reviewed    Valerie Roque [facility] 6/17/2019  8:33 AM Tobacco            Objective:   Vital Signs:   Visit Vitals  /64 (Patient Site: Left Arm, Patient Position: Sitting, Cuff Size: Adult Regular)   Pulse 92   Temp 98.2  F (36.8  C) (Oral)   Resp 16   Ht 5' 4.5\" (1.638 m)   Wt 130 lb 2 oz (59 kg)   LMP 06/06/2019   BMI 21.99 kg/m           PHYSICAL EXAM    Objective:    Physical Exam   Vitals:    06/17/19 0832   BP: 102/64   Pulse: 92   Resp: 16   Temp: 98.2  F (36.8  C)      Constitutional: Patient is oriented to person, place, and time. Patient appears well-developed and well-nourished. No distress.   Head: Normocephalic and atraumatic.   Right Ear: External ear normal. Normal TM  Left Ear: External ear normal. Normal TM  Nose: Nose normal.   Mouth/Throat: Oropharynx is clear and moist. No oropharyngeal exudate.   Eyes: Conjunctivae and EOM are normal. Pupils are equal, round, and reactive to light. Right eye exhibits no discharge. Left eye exhibits no discharge. No scleral icterus.   Neck: Neck supple. No JVD present. No tracheal deviation present. No thyromegaly present.   Cardiovascular: Normal " rate, regular rhythm, normal heart sounds and intact distal pulses. No murmur heard.   Pulmonary/Chest: Effort normal and breath sounds normal. No stridor. No respiratory distress. Patient has no wheezes, no rales, exhibits no tenderness.   Abdominal: Soft. Bowel sounds are normal. Patient exhibits no distension and no mass. There is no tenderness. There is no rebound and no guarding.   Lymphadenopathy:  Patient has no cervical adenopathy.   Neurological: Patient is alert and oriented to person, place, and time. Patient has normal reflexes. No cranial nerve deficit. Coordination normal.   Skin: Skin is warm and dry. No rash noted. Patient is not diaphoretic. No erythema. No pallor.   Breast exam not done per patient's request  Pelvic exam not done per patient's request       Medication List           Accurate as of 6/17/19  9:10 AM. If you have any questions, ask your nurse or doctor.               CONTINUE taking these medications    amitriptyline 25 MG tablet  Also known as:  ELAVIL  INSTRUCTIONS:  Take 1 tablet (25 mg total) by mouth at bedtime.        ondansetron 4 MG disintegrating tablet  Also known as:  ZOFRAN ODT  INSTRUCTIONS:  Take 1 tablet (4 mg total) by mouth every 8 (eight) hours as needed for nausea.        ondansetron 4 MG tablet  Also known as:  ZOFRAN  INSTRUCTIONS:  Take 1 tablet (4 mg total) by mouth every 8 (eight) hours as needed for nausea.               Additional Screenings Completed Today:

## 2021-06-28 ENCOUNTER — APPOINTMENT (OUTPATIENT)
Dept: URBAN - METROPOLITAN AREA CLINIC 260 | Age: 41
Setting detail: DERMATOLOGY
End: 2021-06-28

## 2021-06-28 DIAGNOSIS — Z48.02 ENCOUNTER FOR REMOVAL OF SUTURES: ICD-10-CM

## 2021-06-28 PROCEDURE — OTHER SUTURE REMOVAL (GLOBAL PERIOD): OTHER

## 2021-06-28 ASSESSMENT — LOCATION ZONE DERM: LOCATION ZONE: SCALP

## 2021-06-28 ASSESSMENT — LOCATION SIMPLE DESCRIPTION DERM: LOCATION SIMPLE: RIGHT SCALP

## 2021-06-28 ASSESSMENT — LOCATION DETAILED DESCRIPTION DERM: LOCATION DETAILED: RIGHT MEDIAL FRONTAL SCALP

## 2021-06-28 NOTE — PROCEDURE: SUTURE REMOVAL (GLOBAL PERIOD)
Detail Level: Zone
Add 17929 Cpt? (Important Note: In 2017 The Use Of 40332 Is Being Tracked By Cms To Determine Future Global Period Reimbursement For Global Periods): no

## 2021-07-25 ENCOUNTER — HEALTH MAINTENANCE LETTER (OUTPATIENT)
Age: 41
End: 2021-07-25

## 2021-08-23 ENCOUNTER — APPOINTMENT (OUTPATIENT)
Dept: URBAN - METROPOLITAN AREA CLINIC 260 | Age: 41
Setting detail: DERMATOLOGY
End: 2021-08-24

## 2021-08-23 VITALS — HEIGHT: 65 IN | RESPIRATION RATE: 15 BRPM | WEIGHT: 145 LBS

## 2021-08-23 DIAGNOSIS — L70.0 ACNE VULGARIS: ICD-10-CM

## 2021-08-23 PROCEDURE — OTHER PRESCRIPTION MEDICATION MANAGEMENT: OTHER

## 2021-08-23 PROCEDURE — OTHER PRESCRIPTION: OTHER

## 2021-08-23 PROCEDURE — OTHER COUNSELING: OTHER

## 2021-08-23 PROCEDURE — 99214 OFFICE O/P EST MOD 30 MIN: CPT

## 2021-08-23 RX ORDER — TRETIONIN 0.5 MG/G
0.05 % CREAM TOPICAL QHS
Qty: 1 | Refills: 2 | Status: ERX | COMMUNITY
Start: 2021-08-23

## 2021-08-23 RX ORDER — SPIRONOLACTONE 25 MG/1
25 MG TABLET, FILM COATED ORAL BID
Qty: 270 | Refills: 0 | Status: ERX | COMMUNITY
Start: 2021-08-23

## 2021-08-23 ASSESSMENT — LOCATION ZONE DERM: LOCATION ZONE: FACE

## 2021-08-23 ASSESSMENT — LOCATION SIMPLE DESCRIPTION DERM: LOCATION SIMPLE: LEFT CHEEK

## 2021-08-23 ASSESSMENT — LOCATION DETAILED DESCRIPTION DERM: LOCATION DETAILED: LEFT CENTRAL MALAR CHEEK

## 2021-08-23 NOTE — PROCEDURE: COUNSELING
Bactrim Counseling:  I discussed with the patient the risks of sulfa antibiotics including but not limited to GI upset, allergic reaction, drug rash, diarrhea, dizziness, photosensitivity, and yeast infections.  Rarely, more serious reactions can occur including but not limited to aplastic anemia, agranulocytosis, methemoglobinemia, blood dyscrasias, liver or kidney failure, lung infiltrates or desquamative/blistering drug rashes.
Topical Clindamycin Counseling: Patient counseled that this medication may cause skin irritation or allergic reactions.  In the event of skin irritation, the patient was advised to reduce the amount of the drug applied or use it less frequently.   The patient verbalized understanding of the proper use and possible adverse effects of clindamycin.  All of the patient's questions and concerns were addressed.
Erythromycin Counseling:  I discussed with the patient the risks of erythromycin including but not limited to GI upset, allergic reaction, drug rash, diarrhea, increase in liver enzymes, and yeast infections.
Isotretinoin Counseling: Patient should get monthly blood tests, not donate blood, not drive at night if vision affected, not share medication, and not undergo elective surgery for 6 months after tx completed. Side effects reviewed, pt to contact office should one occur.
Erythromycin Pregnancy And Lactation Text: This medication is Pregnancy Category B and is considered safe during pregnancy. It is also excreted in breast milk.
Topical Clindamycin Pregnancy And Lactation Text: This medication is Pregnancy Category B and is considered safe during pregnancy. It is unknown if it is excreted in breast milk.
Benzoyl Peroxide Counseling: Patient counseled that medicine may cause skin irritation and bleach clothing.  In the event of skin irritation, the patient was advised to reduce the amount of the drug applied or use it less frequently.   The patient verbalized understanding of the proper use and possible adverse effects of benzoyl peroxide.  All of the patient's questions and concerns were addressed.
Tetracycline Counseling: Patient counseled regarding possible photosensitivity and increased risk for sunburn.  Patient instructed to avoid sunlight, if possible.  When exposed to sunlight, patients should wear protective clothing, sunglasses, and sunscreen.  The patient was instructed to call the office immediately if the following severe adverse effects occur:  hearing changes, easy bruising/bleeding, severe headache, or vision changes.  The patient verbalized understanding of the proper use and possible adverse effects of tetracycline.  All of the patient's questions and concerns were addressed. Patient understands to avoid pregnancy while on therapy due to potential birth defects.
Birth Control Pills Pregnancy And Lactation Text: This medication should be avoided if pregnant and for the first 30 days post-partum.
Sarecycline Pregnancy And Lactation Text: This medication is Pregnancy Category D and not consider safe during pregnancy. It is also excreted in breast milk.
Topical Retinoid counseling:  Patient advised to apply a pea-sized amount only at bedtime and wait 30 minutes after washing their face before applying.  If too drying, patient may add a non-comedogenic moisturizer. The patient verbalized understanding of the proper use and possible adverse effects of retinoids.  All of the patient's questions and concerns were addressed.
Minocycline Counseling: Patient advised regarding possible photosensitivity and discoloration of the teeth, skin, lips, tongue and gums.  Patient instructed to avoid sunlight, if possible.  When exposed to sunlight, patients should wear protective clothing, sunglasses, and sunscreen.  The patient was instructed to call the office immediately if the following severe adverse effects occur:  hearing changes, easy bruising/bleeding, severe headache, or vision changes.  The patient verbalized understanding of the proper use and possible adverse effects of minocycline.  All of the patient's questions and concerns were addressed.
Spironolactone Counseling: Patient advised regarding risks of diarrhea, abdominal pain, hyperkalemia, birth defects (for female patients), liver toxicity and renal toxicity. The patient may need blood work to monitor liver and kidney function and potassium levels while on therapy. The patient verbalized understanding of the proper use and possible adverse effects of spironolactone.  All of the patient's questions and concerns were addressed.
Doxycycline Counseling:  Patient counseled regarding possible photosensitivity and increased risk for sunburn.  Patient instructed to avoid sunlight, if possible.  When exposed to sunlight, patients should wear protective clothing, sunglasses, and sunscreen.  The patient was instructed to call the office immediately if the following severe adverse effects occur:  hearing changes, easy bruising/bleeding, severe headache, or vision changes.  The patient verbalized understanding of the proper use and possible adverse effects of doxycycline.  All of the patient's questions and concerns were addressed.
Topical Sulfur Applications Pregnancy And Lactation Text: This medication is Pregnancy Category C and has an unknown safety profile during pregnancy. It is unknown if this topical medication is excreted in breast milk.
Topical Retinoid Pregnancy And Lactation Text: This medication is Pregnancy Category C. It is unknown if this medication is excreted in breast milk.
Benzoyl Peroxide Pregnancy And Lactation Text: This medication is Pregnancy Category C. It is unknown if benzoyl peroxide is excreted in breast milk.
Birth Control Pills Counseling: Birth Control Pill Counseling: I discussed with the patient the potential side effects of OCPs including but not limited to increased risk of stroke, heart attack, thrombophlebitis, deep venous thrombosis, hepatic adenomas, breast changes, GI upset, headaches, and depression.  The patient verbalized understanding of the proper use and possible adverse effects of OCPs. All of the patient's questions and concerns were addressed.
High Dose Vitamin A Counseling: Side effects reviewed, pt to contact office should one occur.
Bactrim Pregnancy And Lactation Text: This medication is Pregnancy Category D and is known to cause fetal risk.  It is also excreted in breast milk.
Tazorac Pregnancy And Lactation Text: This medication is not safe during pregnancy. It is unknown if this medication is excreted in breast milk.
Doxycycline Pregnancy And Lactation Text: This medication is Pregnancy Category D and not consider safe during pregnancy. It is also excreted in breast milk but is considered safe for shorter treatment courses.
Sarecycline Counseling: Patient advised regarding possible photosensitivity and discoloration of the teeth, skin, lips, tongue and gums.  Patient instructed to avoid sunlight, if possible.  When exposed to sunlight, patients should wear protective clothing, sunglasses, and sunscreen.  The patient was instructed to call the office immediately if the following severe adverse effects occur:  hearing changes, easy bruising/bleeding, severe headache, or vision changes.  The patient verbalized understanding of the proper use and possible adverse effects of sarecycline.  All of the patient's questions and concerns were addressed.
Azithromycin Counseling:  I discussed with the patient the risks of azithromycin including but not limited to GI upset, allergic reaction, drug rash, diarrhea, and yeast infections.
Dapsone Pregnancy And Lactation Text: This medication is Pregnancy Category C and is not considered safe during pregnancy or breast feeding.
Azithromycin Pregnancy And Lactation Text: This medication is considered safe during pregnancy and is also secreted in breast milk.
Dapsone Counseling: I discussed with the patient the risks of dapsone including but not limited to hemolytic anemia, agranulocytosis, rashes, methemoglobinemia, kidney failure, peripheral neuropathy, headaches, GI upset, and liver toxicity.  Patients who start dapsone require monitoring including baseline LFTs and weekly CBCs for the first month, then every month thereafter.  The patient verbalized understanding of the proper use and possible adverse effects of dapsone.  All of the patient's questions and concerns were addressed.
Topical Sulfur Applications Counseling: Topical Sulfur Counseling: Patient counseled that this medication may cause skin irritation or allergic reactions.  In the event of skin irritation, the patient was advised to reduce the amount of the drug applied or use it less frequently.   The patient verbalized understanding of the proper use and possible adverse effects of topical sulfur application.  All of the patient's questions and concerns were addressed.
Include Pregnancy/Lactation Warning?: No
Spironolactone Pregnancy And Lactation Text: This medication can cause feminization of the male fetus and should be avoided during pregnancy. The active metabolite is also found in breast milk.
High Dose Vitamin A Pregnancy And Lactation Text: High dose vitamin A therapy is contraindicated during pregnancy and breast feeding.
Isotretinoin Pregnancy And Lactation Text: This medication is Pregnancy Category X and is considered extremely dangerous during pregnancy. It is unknown if it is excreted in breast milk.
Tazorac Counseling:  Patient advised that medication is irritating and drying.  Patient may need to apply sparingly and wash off after an hour before eventually leaving it on overnight.  The patient verbalized understanding of the proper use and possible adverse effects of tazorac.  All of the patient's questions and concerns were addressed.
Detail Level: Zone

## 2021-08-23 NOTE — PROCEDURE: PRESCRIPTION MEDICATION MANAGEMENT
Discontinue Regimen: Tretinoin 0.025 % topical cream QHS
Initiate Treatment: Tretinoin 0.05 % topical cream QHS
Plan: Patient will follow up in 2-3 months if no improvement, otherwise patient will follow up in 6 months for medication renewal.
Continue Regimen: Grape seed oil QHS + Spironolactone 25 mg one tablet QAM and 2 tablets QHS
Detail Level: Simple
Render In Strict Bullet Format?: No

## 2021-09-19 ENCOUNTER — HEALTH MAINTENANCE LETTER (OUTPATIENT)
Age: 41
End: 2021-09-19

## 2021-09-20 DIAGNOSIS — K58.9 IRRITABLE BOWEL SYNDROME, UNSPECIFIED TYPE: Primary | ICD-10-CM

## 2021-09-20 RX ORDER — ONDANSETRON 4 MG/1
TABLET, ORALLY DISINTEGRATING ORAL
COMMUNITY
Start: 2020-12-14 | End: 2021-12-21

## 2021-09-20 NOTE — TELEPHONE ENCOUNTER
Reason for Call:  Medication or medication refill: amitriptyline (ELAVIL) 25 MG tablet    Do you use a [facility]? NO Name of the pharmacy and phone number for the current request:     [facility] pharmacy [id number] @ [facility] in [place], [phone]    Name of the medication requested:   amitriptyline (ELAVIL) 25 MG tablet  TAKE 1 TABLET BY MOUTH EVERYDAY AT BEDTIME     Other request: This Rx is in [employer]. Patient states the medication has been working very well. Patient states she takes this medication for IBS.    Can we leave a detailed message on this number? YES    Phone number patient can be reached at: Home number on file [phone] (home)    Best Time: ANY    Call taken on 9/20/2021 at 11:18 AM by Brenna Christine

## 2021-10-20 ENCOUNTER — TRANSFERRED RECORDS (OUTPATIENT)
Dept: MULTI SPECIALTY CLINIC | Facility: CLINIC | Age: 41
End: 2021-10-20
Payer: COMMERCIAL

## 2021-10-20 LAB
HPV ABSTRACT: NORMAL
PAP-ABSTRACT: NORMAL

## 2021-12-21 DIAGNOSIS — Z87.898 HX OF MOTION SICKNESS: Primary | ICD-10-CM

## 2021-12-21 RX ORDER — ONDANSETRON 4 MG/1
4 TABLET, ORALLY DISINTEGRATING ORAL EVERY 8 HOURS PRN
Qty: 30 TABLET | Refills: 3 | Status: SHIPPED | OUTPATIENT
Start: 2021-12-21 | End: 2022-08-04

## 2021-12-21 NOTE — TELEPHONE ENCOUNTER
..Reason for Call:  Medication or medication refill:    Do you use a [facility]?  Name of the pharmacy and phone number for the current request: [facility]/pharmacy #[id number] - [place], [place] - [address] AT [address] & [place] [place]  [phone]    Name of the medication requested: ondansetron (ZOFRAN-ODT) 4 MG ODT tab    Other request: NONE     Can we leave a detailed message on this number? YES    Phone number patient can be reached at: Home number on file [phone] (home)    Best Time: ANY    Call taken on 12/21/2021 at 12:08 PM by MARIUSZ Roberts

## 2021-12-21 NOTE — TELEPHONE ENCOUNTER
Refill request for: Pending Prescriptions:                       Disp   Refills    ondansetron (ZOFRAN-ODT) 4 MG ODT tab                       Last visit for this medication 12/5/2019  Last fill 12/14/20

## 2022-03-21 ENCOUNTER — APPOINTMENT (OUTPATIENT)
Dept: URBAN - METROPOLITAN AREA CLINIC 260 | Age: 42
Setting detail: DERMATOLOGY
End: 2022-03-22

## 2022-03-21 VITALS — HEIGHT: 65 IN | WEIGHT: 140 LBS

## 2022-03-21 DIAGNOSIS — L70.0 ACNE VULGARIS: ICD-10-CM

## 2022-03-21 DIAGNOSIS — Z41.9 ENCOUNTER FOR PROCEDURE FOR PURPOSES OTHER THAN REMEDYING HEALTH STATE, UNSPECIFIED: ICD-10-CM

## 2022-03-21 PROCEDURE — OTHER PRESCRIPTION MEDICATION MANAGEMENT: OTHER

## 2022-03-21 PROCEDURE — 99214 OFFICE O/P EST MOD 30 MIN: CPT

## 2022-03-21 PROCEDURE — OTHER PRESCRIPTION: OTHER

## 2022-03-21 PROCEDURE — OTHER COUNSELING: OTHER

## 2022-03-21 PROCEDURE — OTHER COSMETIC CONSULTATION: BOTOX: OTHER

## 2022-03-21 PROCEDURE — OTHER MIPS QUALITY: OTHER

## 2022-03-21 RX ORDER — AZELAIC ACID 0.15 G/G
15% GEL TOPICAL
Qty: 50 | Refills: 2 | Status: ERX | COMMUNITY
Start: 2022-03-21

## 2022-03-21 ASSESSMENT — LOCATION SIMPLE DESCRIPTION DERM
LOCATION SIMPLE: RIGHT CHEEK
LOCATION SIMPLE: LEFT CHEEK
LOCATION SIMPLE: RIGHT FOREHEAD

## 2022-03-21 ASSESSMENT — LOCATION ZONE DERM: LOCATION ZONE: FACE

## 2022-03-21 ASSESSMENT — LOCATION DETAILED DESCRIPTION DERM
LOCATION DETAILED: RIGHT SUPERIOR LATERAL MALAR CHEEK
LOCATION DETAILED: LEFT SUPERIOR LATERAL MALAR CHEEK
LOCATION DETAILED: RIGHT INFERIOR MEDIAL FOREHEAD

## 2022-03-21 ASSESSMENT — SEVERITY ASSESSMENT OVERALL AMONG ALL PATIENTS
IN YOUR EXPERIENCE, AMONG ALL PATIENTS YOU HAVE SEEN WITH THIS CONDITION, HOW SEVERE IS THIS PATIENT'S CONDITION?: FEW INFLAMMATORY LESIONS, SOME NONINFLAMMATORY

## 2022-03-21 NOTE — HPI: PIMPLES (ACNE)
What Type Of Note Output Would You Prefer (Optional)?: Bullet Format
How Severe Is Your Acne?: moderate
Is This A New Presentation, Or A Follow-Up?: Follow Up Acne
Additional Comments (Use Complete Sentences): She is planning a pregnancy and going through infertility treatment.  She has already discontinued spironolactone.

## 2022-03-21 NOTE — PROCEDURE: COUNSELING
Detail Level: Zone
Use Enhanced Medication Counseling?: No
Doxycycline Pregnancy And Lactation Text: This medication is Pregnancy Category D and not consider safe during pregnancy. It is also excreted in breast milk but is considered safe for shorter treatment courses.
Tazorac Pregnancy And Lactation Text: This medication is not safe during pregnancy. It is unknown if this medication is excreted in breast milk.
Azelaic Acid Counseling: Patient counseled that medicine may cause skin irritation and to avoid applying near the eyes.  In the event of skin irritation, the patient was advised to reduce the amount of the drug applied or use it less frequently.   The patient verbalized understanding of the proper use and possible adverse effects of azelaic acid.  All of the patient's questions and concerns were addressed.
Tetracycline Pregnancy And Lactation Text: This medication is Pregnancy Category D and not consider safe during pregnancy. It is also excreted in breast milk.
Winlevi Counseling:  I discussed with the patient the risks of topical clascoterone including but not limited to erythema, scaling, itching, and stinging. Patient voiced their understanding.
Bactrim Counseling:  I discussed with the patient the risks of sulfa antibiotics including but not limited to GI upset, allergic reaction, drug rash, diarrhea, dizziness, photosensitivity, and yeast infections.  Rarely, more serious reactions can occur including but not limited to aplastic anemia, agranulocytosis, methemoglobinemia, blood dyscrasias, liver or kidney failure, lung infiltrates or desquamative/blistering drug rashes.
Tazorac Counseling:  Patient advised that medication is irritating and drying.  Patient may need to apply sparingly and wash off after an hour before eventually leaving it on overnight.  The patient verbalized understanding of the proper use and possible adverse effects of tazorac.  All of the patient's questions and concerns were addressed.
High Dose Vitamin A Pregnancy And Lactation Text: High dose vitamin A therapy is contraindicated during pregnancy and breast feeding.
Topical Sulfur Applications Pregnancy And Lactation Text: This medication is Pregnancy Category C and has an unknown safety profile during pregnancy. It is unknown if this topical medication is excreted in breast milk.
Erythromycin Pregnancy And Lactation Text: This medication is Pregnancy Category B and is considered safe during pregnancy. It is also excreted in breast milk.
Birth Control Pills Pregnancy And Lactation Text: This medication should be avoided if pregnant and for the first 30 days post-partum.
Isotretinoin Counseling: Patient should get monthly blood tests, not donate blood, not drive at night if vision affected, not share medication, and not undergo elective surgery for 6 months after tx completed. Side effects reviewed, pt to contact office should one occur.
Benzoyl Peroxide Counseling: Patient counseled that medicine may cause skin irritation and bleach clothing.  In the event of skin irritation, the patient was advised to reduce the amount of the drug applied or use it less frequently.   The patient verbalized understanding of the proper use and possible adverse effects of benzoyl peroxide.  All of the patient's questions and concerns were addressed.
Topical Retinoid counseling:  Patient advised to apply a pea-sized amount only at bedtime and wait 30 minutes after washing their face before applying.  If too drying, patient may add a non-comedogenic moisturizer. The patient verbalized understanding of the proper use and possible adverse effects of retinoids.  All of the patient's questions and concerns were addressed.
Winlevi Pregnancy And Lactation Text: This medication is considered safe during pregnancy and breastfeeding.
Minocycline Counseling: Patient advised regarding possible photosensitivity and discoloration of the teeth, skin, lips, tongue and gums.  Patient instructed to avoid sunlight, if possible.  When exposed to sunlight, patients should wear protective clothing, sunglasses, and sunscreen.  The patient was instructed to call the office immediately if the following severe adverse effects occur:  hearing changes, easy bruising/bleeding, severe headache, or vision changes.  The patient verbalized understanding of the proper use and possible adverse effects of minocycline.  All of the patient's questions and concerns were addressed.
Benzoyl Peroxide Pregnancy And Lactation Text: This medication is Pregnancy Category C. It is unknown if benzoyl peroxide is excreted in breast milk.
High Dose Vitamin A Counseling: Side effects reviewed, pt to contact office should one occur.
Spironolactone Pregnancy And Lactation Text: This medication can cause feminization of the male fetus and should be avoided during pregnancy. The active metabolite is also found in breast milk.
Erythromycin Counseling:  I discussed with the patient the risks of erythromycin including but not limited to GI upset, allergic reaction, drug rash, diarrhea, increase in liver enzymes, and yeast infections.
Sarecycline Counseling: Patient advised regarding possible photosensitivity and discoloration of the teeth, skin, lips, tongue and gums.  Patient instructed to avoid sunlight, if possible.  When exposed to sunlight, patients should wear protective clothing, sunglasses, and sunscreen.  The patient was instructed to call the office immediately if the following severe adverse effects occur:  hearing changes, easy bruising/bleeding, severe headache, or vision changes.  The patient verbalized understanding of the proper use and possible adverse effects of sarecycline.  All of the patient's questions and concerns were addressed.
Isotretinoin Pregnancy And Lactation Text: This medication is Pregnancy Category X and is considered extremely dangerous during pregnancy. It is unknown if it is excreted in breast milk.
Topical Sulfur Applications Counseling: Topical Sulfur Counseling: Patient counseled that this medication may cause skin irritation or allergic reactions.  In the event of skin irritation, the patient was advised to reduce the amount of the drug applied or use it less frequently.   The patient verbalized understanding of the proper use and possible adverse effects of topical sulfur application.  All of the patient's questions and concerns were addressed.
Tetracycline Counseling: Patient counseled regarding possible photosensitivity and increased risk for sunburn.  Patient instructed to avoid sunlight, if possible.  When exposed to sunlight, patients should wear protective clothing, sunglasses, and sunscreen.  The patient was instructed to call the office immediately if the following severe adverse effects occur:  hearing changes, easy bruising/bleeding, severe headache, or vision changes.  The patient verbalized understanding of the proper use and possible adverse effects of tetracycline.  All of the patient's questions and concerns were addressed. Patient understands to avoid pregnancy while on therapy due to potential birth defects.
Azithromycin Pregnancy And Lactation Text: This medication is considered safe during pregnancy and is also secreted in breast milk.
Topical Clindamycin Pregnancy And Lactation Text: This medication is Pregnancy Category B and is considered safe during pregnancy. It is unknown if it is excreted in breast milk.
Doxycycline Counseling:  Patient counseled regarding possible photosensitivity and increased risk for sunburn.  Patient instructed to avoid sunlight, if possible.  When exposed to sunlight, patients should wear protective clothing, sunglasses, and sunscreen.  The patient was instructed to call the office immediately if the following severe adverse effects occur:  hearing changes, easy bruising/bleeding, severe headache, or vision changes.  The patient verbalized understanding of the proper use and possible adverse effects of doxycycline.  All of the patient's questions and concerns were addressed.
Azelaic Acid Pregnancy And Lactation Text: This medication is considered safe during pregnancy and breast feeding.
Azithromycin Counseling:  I discussed with the patient the risks of azithromycin including but not limited to GI upset, allergic reaction, drug rash, diarrhea, and yeast infections.
Dapsone Pregnancy And Lactation Text: This medication is Pregnancy Category C and is not considered safe during pregnancy or breast feeding.
Bactrim Pregnancy And Lactation Text: This medication is Pregnancy Category D and is known to cause fetal risk.  It is also excreted in breast milk.
Topical Retinoid Pregnancy And Lactation Text: This medication is Pregnancy Category C. It is unknown if this medication is excreted in breast milk.
Spironolactone Counseling: Patient advised regarding risks of diarrhea, abdominal pain, hyperkalemia, birth defects (for female patients), liver toxicity and renal toxicity. The patient may need blood work to monitor liver and kidney function and potassium levels while on therapy. The patient verbalized understanding of the proper use and possible adverse effects of spironolactone.  All of the patient's questions and concerns were addressed.
Dapsone Counseling: I discussed with the patient the risks of dapsone including but not limited to hemolytic anemia, agranulocytosis, rashes, methemoglobinemia, kidney failure, peripheral neuropathy, headaches, GI upset, and liver toxicity.  Patients who start dapsone require monitoring including baseline LFTs and weekly CBCs for the first month, then every month thereafter.  The patient verbalized understanding of the proper use and possible adverse effects of dapsone.  All of the patient's questions and concerns were addressed.
Birth Control Pills Counseling: Birth Control Pill Counseling: I discussed with the patient the potential side effects of OCPs including but not limited to increased risk of stroke, heart attack, thrombophlebitis, deep venous thrombosis, hepatic adenomas, breast changes, GI upset, headaches, and depression.  The patient verbalized understanding of the proper use and possible adverse effects of OCPs. All of the patient's questions and concerns were addressed.
Topical Clindamycin Counseling: Patient counseled that this medication may cause skin irritation or allergic reactions.  In the event of skin irritation, the patient was advised to reduce the amount of the drug applied or use it less frequently.   The patient verbalized understanding of the proper use and possible adverse effects of clindamycin.  All of the patient's questions and concerns were addressed.

## 2022-03-21 NOTE — PROCEDURE: PRESCRIPTION MEDICATION MANAGEMENT
Render In Strict Bullet Format?: No
Discontinue Regimen: Spironolactone and Tretinoin cream
Initiate Treatment: Azelaic Acid 15%
Modify Regimen: Use OTC retinoid cream since patient is trying to conceive.
Detail Level: Simple
Plan: Return as needed for acne recheck

## 2022-04-11 ENCOUNTER — HOSPITAL ENCOUNTER (OUTPATIENT)
Dept: RADIOLOGY | Facility: CLINIC | Age: 42
Discharge: HOME | End: 2022-04-11
Attending: OBSTETRICS & GYNECOLOGY | Admitting: RADIOLOGY
Payer: COMMERCIAL

## 2022-04-11 DIAGNOSIS — N97.9 INFERTILITY, FEMALE: ICD-10-CM

## 2022-04-11 PROCEDURE — 74740 X-RAY FEMALE GENITAL TRACT: CPT

## 2022-04-11 PROCEDURE — 255N000002 HC RX 255 OP 636: Performed by: OBSTETRICS & GYNECOLOGY

## 2022-04-11 RX ORDER — IOPAMIDOL 612 MG/ML
3 INJECTION, SOLUTION INTRAVASCULAR ONCE
Status: COMPLETED | OUTPATIENT
Start: 2022-04-11 | End: 2022-04-11

## 2022-04-11 RX ADMIN — IOPAMIDOL 3 ML: 612 INJECTION, SOLUTION INTRAVENOUS at 14:43

## 2022-04-18 ENCOUNTER — APPOINTMENT (OUTPATIENT)
Dept: URBAN - METROPOLITAN AREA CLINIC 260 | Age: 42
Setting detail: DERMATOLOGY
End: 2022-04-19

## 2022-04-18 DIAGNOSIS — Z41.9 ENCOUNTER FOR PROCEDURE FOR PURPOSES OTHER THAN REMEDYING HEALTH STATE, UNSPECIFIED: ICD-10-CM

## 2022-04-18 PROCEDURE — OTHER ADDITIONAL NOTES: OTHER

## 2022-04-18 PROCEDURE — OTHER BOTOX (U OR CC): OTHER

## 2022-04-18 ASSESSMENT — LOCATION DETAILED DESCRIPTION DERM
LOCATION DETAILED: LEFT LATERAL FOREHEAD
LOCATION DETAILED: LEFT FOREHEAD
LOCATION DETAILED: SUPERIOR MID FOREHEAD
LOCATION DETAILED: RIGHT FOREHEAD

## 2022-04-18 ASSESSMENT — LOCATION ZONE DERM: LOCATION ZONE: FACE

## 2022-04-18 ASSESSMENT — LOCATION SIMPLE DESCRIPTION DERM
LOCATION SIMPLE: RIGHT FOREHEAD
LOCATION SIMPLE: SUPERIOR FOREHEAD
LOCATION SIMPLE: LEFT FOREHEAD

## 2022-04-18 NOTE — PROCEDURE: BOTOX (U OR CC)
Detail Level: Detailed
Quantity Per Injection Site (Units Or Cc): 4 U
Additional Area 4 Units: 0
Including Pricing Information In The Note: Yes
Post-Care Instructions: Patient instructed to not lie down for 4 hours and limit physical activity for 24 hours. Patient instructed not to travel by airplane for 48 hours.
Forehead Units/Cc: 20
Dilution (U/0.1 Cc): 10
Expiration Date (Month Year): 04/2024
Document As Units Or Cc?: units
Consent: Written consent obtained. Risks include but not limited to lid/brow ptosis, bruising, swelling, diplopia, temporary effect, incomplete chemical denervation.

## 2022-04-18 NOTE — PROCEDURE: ADDITIONAL NOTES
Render Risk Assessment In Note?: no
Additional Notes: Patient is first time botox user and qualifies for [employer] program.  Utilized sample product - no charge for todays units.
Detail Level: Zone

## 2022-04-21 ENCOUNTER — RX ONLY (RX ONLY)
Age: 42
End: 2022-04-21

## 2022-04-21 RX ORDER — TRETIONIN 0.5 MG/G
CREAM TOPICAL QHS
Qty: 45 | Refills: 2 | Status: ERX

## 2022-05-24 ENCOUNTER — APPOINTMENT (OUTPATIENT)
Dept: URBAN - METROPOLITAN AREA CLINIC 260 | Age: 42
Setting detail: DERMATOLOGY
End: 2022-05-30

## 2022-05-24 VITALS — HEIGHT: 65 IN | WEIGHT: 140 LBS

## 2022-05-24 DIAGNOSIS — L70.0 ACNE VULGARIS: ICD-10-CM

## 2022-05-24 PROCEDURE — OTHER COUNSELING: OTHER

## 2022-05-24 PROCEDURE — OTHER PRESCRIPTION MEDICATION MANAGEMENT: OTHER

## 2022-05-24 PROCEDURE — 99214 OFFICE O/P EST MOD 30 MIN: CPT

## 2022-05-24 ASSESSMENT — LOCATION SIMPLE DESCRIPTION DERM: LOCATION SIMPLE: LEFT CHEEK

## 2022-05-24 ASSESSMENT — LOCATION ZONE DERM: LOCATION ZONE: FACE

## 2022-05-24 ASSESSMENT — LOCATION DETAILED DESCRIPTION DERM: LOCATION DETAILED: LEFT INFERIOR MEDIAL MALAR CHEEK

## 2022-05-24 NOTE — PROCEDURE: COUNSELING
Tazorac Counseling:  Patient advised that medication is irritating and drying.  Patient may need to apply sparingly and wash off after an hour before eventually leaving it on overnight.  The patient verbalized understanding of the proper use and possible adverse effects of tazorac.  All of the patient's questions and concerns were addressed.
Winlevi Pregnancy And Lactation Text: This medication is considered safe during pregnancy and breastfeeding.
Detail Level: Zone
High Dose Vitamin A Counseling: Side effects reviewed, pt to contact office should one occur.
Use Enhanced Medication Counseling?: No
Erythromycin Pregnancy And Lactation Text: This medication is Pregnancy Category B and is considered safe during pregnancy. It is also excreted in breast milk.
Doxycycline Pregnancy And Lactation Text: This medication is Pregnancy Category D and not consider safe during pregnancy. It is also excreted in breast milk but is considered safe for shorter treatment courses.
Topical Sulfur Applications Counseling: Topical Sulfur Counseling: Patient counseled that this medication may cause skin irritation or allergic reactions.  In the event of skin irritation, the patient was advised to reduce the amount of the drug applied or use it less frequently.   The patient verbalized understanding of the proper use and possible adverse effects of topical sulfur application.  All of the patient's questions and concerns were addressed.
Sarecycline Counseling: Patient advised regarding possible photosensitivity and discoloration of the teeth, skin, lips, tongue and gums.  Patient instructed to avoid sunlight, if possible.  When exposed to sunlight, patients should wear protective clothing, sunglasses, and sunscreen.  The patient was instructed to call the office immediately if the following severe adverse effects occur:  hearing changes, easy bruising/bleeding, severe headache, or vision changes.  The patient verbalized understanding of the proper use and possible adverse effects of sarecycline.  All of the patient's questions and concerns were addressed.
Isotretinoin Counseling: Patient should get monthly blood tests, not donate blood, not drive at night if vision affected, not share medication, and not undergo elective surgery for 6 months after tx completed. Side effects reviewed, pt to contact office should one occur.
Isotretinoin Pregnancy And Lactation Text: This medication is Pregnancy Category X and is considered extremely dangerous during pregnancy. It is unknown if it is excreted in breast milk.
Spironolactone Counseling: Patient advised regarding risks of diarrhea, abdominal pain, hyperkalemia, birth defects (for female patients), liver toxicity and renal toxicity. The patient may need blood work to monitor liver and kidney function and potassium levels while on therapy. The patient verbalized understanding of the proper use and possible adverse effects of spironolactone.  All of the patient's questions and concerns were addressed.
High Dose Vitamin A Pregnancy And Lactation Text: High dose vitamin A therapy is contraindicated during pregnancy and breast feeding.
Benzoyl Peroxide Counseling: Patient counseled that medicine may cause skin irritation and bleach clothing.  In the event of skin irritation, the patient was advised to reduce the amount of the drug applied or use it less frequently.   The patient verbalized understanding of the proper use and possible adverse effects of benzoyl peroxide.  All of the patient's questions and concerns were addressed.
Bactrim Pregnancy And Lactation Text: This medication is Pregnancy Category D and is known to cause fetal risk.  It is also excreted in breast milk.
Azithromycin Pregnancy And Lactation Text: This medication is considered safe during pregnancy and is also secreted in breast milk.
Topical Clindamycin Pregnancy And Lactation Text: This medication is Pregnancy Category B and is considered safe during pregnancy. It is unknown if it is excreted in breast milk.
Aklief Pregnancy And Lactation Text: It is unknown if this medication is safe to use during pregnancy.  It is unknown if this medication is excreted in breast milk.  Breastfeeding women should use the topical cream on the smallest area of the skin for the shortest time needed while breastfeeding.  Do not apply to nipple and areola.
Tetracycline Pregnancy And Lactation Text: This medication is Pregnancy Category D and not consider safe during pregnancy. It is also excreted in breast milk.
Birth Control Pills Pregnancy And Lactation Text: This medication should be avoided if pregnant and for the first 30 days post-partum.
Azelaic Acid Counseling: Patient counseled that medicine may cause skin irritation and to avoid applying near the eyes.  In the event of skin irritation, the patient was advised to reduce the amount of the drug applied or use it less frequently.   The patient verbalized understanding of the proper use and possible adverse effects of azelaic acid.  All of the patient's questions and concerns were addressed.
Topical Clindamycin Counseling: Patient counseled that this medication may cause skin irritation or allergic reactions.  In the event of skin irritation, the patient was advised to reduce the amount of the drug applied or use it less frequently.   The patient verbalized understanding of the proper use and possible adverse effects of clindamycin.  All of the patient's questions and concerns were addressed.
Benzoyl Peroxide Pregnancy And Lactation Text: This medication is Pregnancy Category C. It is unknown if benzoyl peroxide is excreted in breast milk.
Topical Sulfur Applications Pregnancy And Lactation Text: This medication is Pregnancy Category C and has an unknown safety profile during pregnancy. It is unknown if this topical medication is excreted in breast milk.
Spironolactone Pregnancy And Lactation Text: This medication can cause feminization of the male fetus and should be avoided during pregnancy. The active metabolite is also found in breast milk.
Aklief counseling:  Patient advised to apply a pea-sized amount only at bedtime and wait 30 minutes after washing their face before applying.  If too drying, patient may add a non-comedogenic moisturizer.  The most commonly reported side effects including irritation, redness, scaling, dryness, stinging, burning, itching, and increased risk of sunburn.  The patient verbalized understanding of the proper use and possible adverse effects of retinoids.  All of the patient's questions and concerns were addressed.
Dapsone Counseling: I discussed with the patient the risks of dapsone including but not limited to hemolytic anemia, agranulocytosis, rashes, methemoglobinemia, kidney failure, peripheral neuropathy, headaches, GI upset, and liver toxicity.  Patients who start dapsone require monitoring including baseline LFTs and weekly CBCs for the first month, then every month thereafter.  The patient verbalized understanding of the proper use and possible adverse effects of dapsone.  All of the patient's questions and concerns were addressed.
Tazorac Pregnancy And Lactation Text: This medication is not safe during pregnancy. It is unknown if this medication is excreted in breast milk.
Topical Retinoid Pregnancy And Lactation Text: This medication is Pregnancy Category C. It is unknown if this medication is excreted in breast milk.
Azithromycin Counseling:  I discussed with the patient the risks of azithromycin including but not limited to GI upset, allergic reaction, drug rash, diarrhea, and yeast infections.
Erythromycin Counseling:  I discussed with the patient the risks of erythromycin including but not limited to GI upset, allergic reaction, drug rash, diarrhea, increase in liver enzymes, and yeast infections.
Topical Retinoid counseling:  Patient advised to apply a pea-sized amount only at bedtime and wait 30 minutes after washing their face before applying.  If too drying, patient may add a non-comedogenic moisturizer. The patient verbalized understanding of the proper use and possible adverse effects of retinoids.  All of the patient's questions and concerns were addressed.
Winlevi Counseling:  I discussed with the patient the risks of topical clascoterone including but not limited to erythema, scaling, itching, and stinging. Patient voiced their understanding.
Birth Control Pills Counseling: Birth Control Pill Counseling: I discussed with the patient the potential side effects of OCPs including but not limited to increased risk of stroke, heart attack, thrombophlebitis, deep venous thrombosis, hepatic adenomas, breast changes, GI upset, headaches, and depression.  The patient verbalized understanding of the proper use and possible adverse effects of OCPs. All of the patient's questions and concerns were addressed.
Tetracycline Counseling: Patient counseled regarding possible photosensitivity and increased risk for sunburn.  Patient instructed to avoid sunlight, if possible.  When exposed to sunlight, patients should wear protective clothing, sunglasses, and sunscreen.  The patient was instructed to call the office immediately if the following severe adverse effects occur:  hearing changes, easy bruising/bleeding, severe headache, or vision changes.  The patient verbalized understanding of the proper use and possible adverse effects of tetracycline.  All of the patient's questions and concerns were addressed. Patient understands to avoid pregnancy while on therapy due to potential birth defects.
Azelaic Acid Pregnancy And Lactation Text: This medication is considered safe during pregnancy and breast feeding.
Bactrim Counseling:  I discussed with the patient the risks of sulfa antibiotics including but not limited to GI upset, allergic reaction, drug rash, diarrhea, dizziness, photosensitivity, and yeast infections.  Rarely, more serious reactions can occur including but not limited to aplastic anemia, agranulocytosis, methemoglobinemia, blood dyscrasias, liver or kidney failure, lung infiltrates or desquamative/blistering drug rashes.
Minocycline Counseling: Patient advised regarding possible photosensitivity and discoloration of the teeth, skin, lips, tongue and gums.  Patient instructed to avoid sunlight, if possible.  When exposed to sunlight, patients should wear protective clothing, sunglasses, and sunscreen.  The patient was instructed to call the office immediately if the following severe adverse effects occur:  hearing changes, easy bruising/bleeding, severe headache, or vision changes.  The patient verbalized understanding of the proper use and possible adverse effects of minocycline.  All of the patient's questions and concerns were addressed.
Dapsone Pregnancy And Lactation Text: This medication is Pregnancy Category C and is not considered safe during pregnancy or breast feeding.
Doxycycline Counseling:  Patient counseled regarding possible photosensitivity and increased risk for sunburn.  Patient instructed to avoid sunlight, if possible.  When exposed to sunlight, patients should wear protective clothing, sunglasses, and sunscreen.  The patient was instructed to call the office immediately if the following severe adverse effects occur:  hearing changes, easy bruising/bleeding, severe headache, or vision changes.  The patient verbalized understanding of the proper use and possible adverse effects of doxycycline.  All of the patient's questions and concerns were addressed.

## 2022-05-24 NOTE — PROCEDURE: PRESCRIPTION MEDICATION MANAGEMENT
Render In Strict Bullet Format?: No
Detail Level: Zone
Continue Regimen: Tretinoin, refills x 2 sent 4/2022. \\nAzelaic acid bid\\nInstructed patient to call [facility] to refill, call clinic if they need a new script.

## 2022-08-02 DIAGNOSIS — Z87.898 HX OF MOTION SICKNESS: ICD-10-CM

## 2022-08-03 NOTE — TELEPHONE ENCOUNTER
"Routing refill request to provider for review/approval because:  Patient needs to be seen because it has been more than 1 year since last office visit.    Last Written Prescription Date:  12/21/21  Last Fill Quantity: 30,  # refills: 3   Last office visit provider:  12/5/19     Requested Prescriptions   Pending Prescriptions Disp Refills     ondansetron (ZOFRAN ODT) 4 MG ODT tab [Pharmacy Med Name: ONDANSETRON ODT 4 MG TABLET] 9 tablet 13     Sig: DISSOLVE 1 TABLET BY MOUTH EVERY 8 HOURS AS NEEDED FOR NAUSEA        Antivertigo/Antiemetic Agents Failed - 8/2/2022  8:58 PM        Failed - Recent (12 mo) or future (30 days) visit within the authorizing provider's specialty     Patient has had an office visit with the authorizing provider or a provider within the authorizing providers department within the previous 12 mos or has a future within next 30 days. See \"Patient Info\" tab in inbasket, or \"Choose Columns\" in Meds & Orders section of the refill encounter.              Passed - Medication is active on med list        Passed - Patient is 18 years of age or older             Rebecca Mejia RN 08/03/22 2:31 PM  "

## 2022-08-04 RX ORDER — ONDANSETRON 4 MG/1
TABLET, ORALLY DISINTEGRATING ORAL
Qty: 20 TABLET | Refills: 0 | Status: SHIPPED | OUTPATIENT
Start: 2022-08-04

## 2022-08-19 ENCOUNTER — APPOINTMENT (OUTPATIENT)
Dept: ULTRASOUND IMAGING | Facility: CLINIC | Age: 42
End: 2022-08-19
Attending: EMERGENCY MEDICINE
Payer: COMMERCIAL

## 2022-08-19 ENCOUNTER — HOSPITAL ENCOUNTER (EMERGENCY)
Facility: CLINIC | Age: 42
Discharge: HOME | End: 2022-08-19
Attending: EMERGENCY MEDICINE | Admitting: EMERGENCY MEDICINE
Payer: COMMERCIAL

## 2022-08-19 VITALS
HEIGHT: 65 IN | WEIGHT: 151 LBS | RESPIRATION RATE: 16 BRPM | TEMPERATURE: 97.5 F | DIASTOLIC BLOOD PRESSURE: 65 MMHG | OXYGEN SATURATION: 100 % | SYSTOLIC BLOOD PRESSURE: 104 MMHG | BODY MASS INDEX: 25.16 KG/M2 | HEART RATE: 88 BPM

## 2022-08-19 DIAGNOSIS — O46.90 VAGINAL BLEEDING IN PREGNANCY: ICD-10-CM

## 2022-08-19 PROBLEM — M43.6 TORTICOLLIS: Status: ACTIVE | Noted: 2022-08-19

## 2022-08-19 PROBLEM — E55.9 VITAMIN D DEFICIENCY: Status: ACTIVE | Noted: 2022-08-19

## 2022-08-19 PROBLEM — J30.9 ALLERGIC RHINITIS: Status: ACTIVE | Noted: 2022-08-19

## 2022-08-19 PROBLEM — L71.9 ROSACEA: Status: ACTIVE | Noted: 2022-08-19

## 2022-08-19 PROCEDURE — 99284 EMERGENCY DEPT VISIT MOD MDM: CPT | Mod: 25

## 2022-08-19 PROCEDURE — 76815 OB US LIMITED FETUS(S): CPT

## 2022-08-19 ASSESSMENT — ACTIVITIES OF DAILY LIVING (ADL): ADLS_ACUITY_SCORE: 35

## 2022-08-19 NOTE — ED TRIAGE NOTES
Patient states since last night she has had bright red blood when urinating in toilet.  She said she has a pad on that feels wet but hasn't checked it.  She is 16 weeks pregnant.  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

## 2022-08-19 NOTE — ED PROVIDER NOTES
EMERGENCY DEPARTMENT ENCOUNTER      NAME: Braxton Kilgore  AGE: 42 year old female  YOB: 1980  MRN: [id number]  EVALUATION DATE & TIME: 2022  8:15 AM    PCP: Cecy Mills    ED PROVIDER: Juni Ann M.D.      Chief Complaint   Patient presents with     Pregnancy Complications       FINAL IMPRESSION:  1. Vaginal bleeding in pregnancy    2. Subchorionic hemorrhage of placenta in second trimester, single or unspecified fetus        ED COURSE & MEDICAL DECISION MAKIN year old female presents to the Emergency Department for evaluation of pregnancy complication.  Patient is 16 weeks pregnant.  History of Asherman syndrome and previous miscarriage.  She presents with light vaginal spotting and lower abdominal cramping.  Her vital signs and exam are reassuring at this time.  Per chart review she is Rh+.  Ultrasound today shows a small subchorionic hemorrhage, no placenta previa, no signs of fetal distress or imminent miscarriage.  I do not think there is any further indication for additional lab or imaging work-up based on her stable exam and symptoms.  She does not have any urinary complaints.  I updated her about her ultrasound findings.  She was comfortable with following up in her OB/GYN clinic, she will call them as soon as possible ideally today to confirm follow-up.  We discussed return precautions including for any intractable pain or severe increase in bleeding.  Patient was discharged in stable condition.    At the conclusion of the encounter I discussed the results of all of the tests and the disposition. The questions were answered. The patient or family acknowledged understanding and was agreeable with the care plan.       MEDICATIONS GIVEN IN THE EMERGENCY:  Medications - No data to display    NEW PRESCRIPTIONS STARTED AT TODAY'S ER VISIT  New Prescriptions    No medications on file           =================================================================    HPI    Patient information was obtained from: patient     Use of : N/A         Braxton Kilgore is a 42 year old female with a pertinent history of Asherman's syndrome, IBS, recurrent pregnancy loss, retained portions of placenta, dilation and curettage (2008, 2011, 2016) who presents to this ED by walk in for evaluation of pregnancy complications.    Per chart review,  6/19/2022: Patient was seen in the ED for vaginal bleeding. Ultrasound impression: Single living intrauterine gestation at 7 weeks 1 day. Normal FHTs. Patient will follow up with normal outpatient providers. Given keflex for bacteruria.     Patient reports she is 16 weeks pregnant and experiencing vaginal bleeding. She reports she noticed the bleeding this morning, but suspects she was spotting throughout the night. Patient says her pads have not been getting soaked with blood. Patient also reports back pain and abdominal pain. She describes the abdominal pain as constant cramping beginning today. Patient has had 2 previous miscarriages at 8 weeks and struggles with fertility issues. Patient denies a history of abdominal surgery but has had several uterine surgeries related to her Asherman's disease. She has carried pregnancies to term before without vaginal bleeding that resulted in live births. Patient denies any other complaints at this time.         REVIEW OF SYSTEMS   All systems reviewed and negative except as noted in HPI.    PAST MEDICAL HISTORY:  History reviewed. No pertinent past medical history.    PAST SURGICAL HISTORY:  Past Surgical History:   Procedure Laterality Date     DILATION AND CURETTAGE       DILATION AND CURETTAGE       DILATION AND CURETTAGE  2008 and 2011     DILATION AND CURETTAGE, DIAGNOSTIC / THERAPEUTIC  2011, 2016    for miscarriages     HC TOOTH EXTRACTION W/FORCEP       HYSTEROSCOPY       HYSTEROSCOPY DIAGNOSTIC  2016    x2 for  infertility evaluation     OPERATIVE HYSTEROSCOPY N/A 7/19/2016    Procedure: OPERATIVE HYSTEROSCOPY;  Surgeon: Sangita Masters MD;  Location: [facility] OR           CURRENT MEDICATIONS:    No current facility-administered medications for this encounter.     Current Outpatient Medications   Medication     amitriptyline (ELAVIL) 25 MG tablet     doxycycline (VIBRAMYCIN) 100 MG capsule     ESTERIFIED ESTROGENS PO     HYDROcodone-acetaminophen (NORCO) 5-325 MG per tablet     levonorgestrel (MIRENA) 20 MCG/24HR IUD     ondansetron (ZOFRAN ODT) 4 MG ODT tab     senna-docusate (SENOKOT-S;PERICOLACE) 8.6-50 MG per tablet         ALLERGIES:  Allergies   Allergen Reactions     Ibuprofen Other (See Comments)     Has IBS and it hurts her stomach really bad to take Ibuprofen.     Sulfa Drugs Rash     Toradol [Ketorolac Tromethamine] Other (See Comments)     Has IBS and it hurts her stomach really bad to take Toradol.       FAMILY HISTORY:  Family History   Problem Relation Age of Onset     Hypertension Maternal Grandmother      Cerebrovascular Disease Maternal Grandfather      Allergies Father      Allergies Paternal Grandfather      Gastrointestinal Disease Mother         IBS     Gastrointestinal Disease Maternal Grandmother         IBS     Osteoporosis Maternal Grandmother      Psychotic Disorder Paternal Grandmother      Irritable Bowel Syndrome Mother      Irritable Bowel Syndrome Maternal Aunt      Irritable Bowel Syndrome Maternal Uncle      Irritable Bowel Syndrome Maternal Grandmother      Hypertension Paternal Grandmother      Parkinsonism Paternal Grandfather      Hypertension Father        SOCIAL HISTORY:   Social History     Socioeconomic History     Marital status:      Spouse name: None     Number of children: 1     Years of education: None     Highest education level: None   Occupational History     Employer: STUDENT   Tobacco Use     Smoking status: Never Smoker     Smokeless tobacco: Never Used   Substance and  "Sexual Activity     Alcohol use: Yes     Comment: Alcoholic Drinks/day: 1 bottle of wine a month     Drug use: No     Sexual activity: Yes     Partners: Male     Birth control/protection: Pill, I.U.D.   Social History Narrative    Social Documentation:        Balanced Diet: YES    Calcium intake: 2-3 per day    Caffeine: 1-2 tea no coffee per day    Exercise:  type of activity on her feet a lot, work out 2-3 times per week    Sunscreen: Yes    Seatbelts:  Yes    Self Breast Exam:  Yes- not monthly    Physical/Emotional/Sexual Abuse: No     Do you feel safe in your environment? Yes        Cholesterol screen up to date: No: never has this checked nothing noted in FX HX    Eye Exam up to date: Yes    Dental Exam up to date: Yes    Pap smear up to date: Yes, do today.  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 Exercises 5x/week at [facility]. No alcohol or smoking hx.   Cecy Mills MD         VITALS:  /83   Pulse 91   Temp 97.5  F (36.4  C) (Temporal)   Resp 16   Ht 1.651 m (5' 5\")   Wt 68.5 kg (151 lb)   LMP  (Approximate)   SpO2 100%   BMI 25.13 kg/m      PHYSICAL EXAM    Constitutional: Well developed, Well nourished, NAD.  HENT: Normocephalic, Atraumatic. Neck Supple.  Eyes: EOMI, Conjunctiva normal.  Respiratory: Breathing comfortably on room air. Speaks full sentences easily. Lungs clear to ascultation.  Cardiovascular: Normal heart rate, Regular rhythm. No peripheral edema.  Abdomen: Soft, gravid, nontender.  Musculoskeletal: Good range of motion in all major joints. No major deformities noted.  Integument: Warm, " Dry.  Neurologic: Alert & awake, Normal motor function, Normal sensory function, No focal deficits noted.   Psychiatric: Cooperative. Affect appropriate.       RADIOLOGY:  Reviewed all pertinent imaging. Please see official radiology report.  US OB >14 Weeks Limited wo Fetal Measurement   Final Result   IMPRESSION:   1.  Single intrauterine gestation.       2.  Probable tiny subchorionic bleed at the edge of the placenta lower uterine segment just anterior to the cervix.               I, Kirsten Orellana, am serving as a scribe to document services personally performed by Dr. Juni Ann, based on my observation and the provider's statements to me. I, Juni Ann MD attest that Kirsten Orellana is acting in a scribe capacity, has observed my performance of the services and has documented them in accordance with my direction.    Juni Ann M.D.  Emergency Medicine  [facility] EMERGENCY ROOM  [address] [address]  [phone]  Dept: [phone]       Juni Ann MD  08/19/22 0918

## 2022-08-19 NOTE — DISCHARGE INSTRUCTIONS
----- Message from Cynthia Ramos sent at 11/22/2021 12:36 PM EST -----  Subject: Message to Provider    QUESTIONS  Information for Provider? Patient was seen in ED for head injury from   fall, no sutures. ED told her to call and check to see if you wanted to   see her, Pt feeling better, sore on forehead when touching area only. Taking aspirin for pain. taking it easy and elevating head when laying   down. Please call her if needed. ---------------------------------------------------------------------------  --------------  Warden Dimas RAMACHANDRAN  What is the best way for the office to contact you? OK to leave message on   voicemail  Preferred Call Back Phone Number? [phone]  ---------------------------------------------------------------------------  --------------  SCRIPT ANSWERS  Relationship to Patient?  Self You were seen in the emergency department at [facility] for second trimester vaginal bleeding.  Your ultrasound showed a small subchorionic hemorrhage which is bleeding below the placenta which is a very common cause of first and second trimester vaginal bleeding.  There were no signs of fetal distress on her ultrasound here and we are relieved by her stable exam and vital signs.  We would like you to review this development with your OB/GYN clinic as soon as possible.  Right now there is nothing else specific that you need to be doing.  You can use Tylenol 650 mg every 6 hours for pain.  If you do have a severe increase in vaginal bleeding, multiple pads per hour for multiple hours, fevers, severe increase in abdominal pain, we can reevaluate you as needed in the emergency department.

## 2022-08-21 ENCOUNTER — HEALTH MAINTENANCE LETTER (OUTPATIENT)
Age: 42
End: 2022-08-21

## 2022-09-19 DIAGNOSIS — K58.9 IRRITABLE BOWEL SYNDROME, UNSPECIFIED TYPE: ICD-10-CM

## 2022-09-20 NOTE — TELEPHONE ENCOUNTER
"Routing refill request to provider for review/approval because:  Patient needs to be seen because it has been more than 2 years since last office visit.  Pregnancy warning    Last Written Prescription Date:  9/20/21  Last Fill Quantity: 90,  # refills: 3   Last office visit provider:  12/5/2019     Requested Prescriptions   Pending Prescriptions Disp Refills     amitriptyline (ELAVIL) 25 MG tablet [Pharmacy Med Name: AMITRIPTYLINE HCL 25 MG TAB] 90 tablet 3     Sig: TAKE 1 TABLET BY MOUTH EVERY DAY       Tricyclic Agents ( Annual appt and no PHQ9) Failed - 9/19/2022 12:25 AM        Failed - Recent (12 mo) or future (30 days) visit within authorizing provider's specialty     Patient has had an office visit with the authorizing provider or a provider within the authorizing providers department within the previous 12 mos or has a future within next 30 days. See \"Patient Info\" tab in inbasket, or \"Choose Columns\" in Meds & Orders section of the refill encounter.              Failed - Patient is not pregnant        Passed - Blood Pressure under 140/90 in past 12 mos     BP Readings from Last 3 Encounters:   08/19/22 104/65   12/05/19 104/68   07/19/16 96/76                 Passed - Medication is active on med list        Passed - Patient is age 18 or older        Passed - No positive pregnancy test on record in past 12 mos             Douglas Roberts RN 09/20/22 11:42 AM  "

## 2022-09-26 ENCOUNTER — HOSPITAL ENCOUNTER (OUTPATIENT)
Facility: CLINIC | Age: 42
Discharge: HOME | End: 2022-09-26
Attending: FAMILY MEDICINE | Admitting: FAMILY MEDICINE
Payer: COMMERCIAL

## 2022-09-26 ENCOUNTER — HOSPITAL ENCOUNTER (OUTPATIENT)
Facility: CLINIC | Age: 42
End: 2022-09-26
Admitting: FAMILY MEDICINE
Payer: COMMERCIAL

## 2022-09-26 ENCOUNTER — APPOINTMENT (OUTPATIENT)
Dept: ULTRASOUND IMAGING | Facility: CLINIC | Age: 42
End: 2022-09-26
Payer: COMMERCIAL

## 2022-09-26 VITALS
WEIGHT: 157 LBS | OXYGEN SATURATION: 98 % | DIASTOLIC BLOOD PRESSURE: 63 MMHG | RESPIRATION RATE: 18 BRPM | TEMPERATURE: 98.6 F | BODY MASS INDEX: 26.16 KG/M2 | SYSTOLIC BLOOD PRESSURE: 110 MMHG | HEIGHT: 65 IN | HEART RATE: 88 BPM

## 2022-09-26 PROCEDURE — 76815 OB US LIMITED FETUS(S): CPT

## 2022-09-26 RX ORDER — PRENATAL VIT/IRON FUM/FOLIC AC 27MG-0.8MG
1 TABLET ORAL DAILY
COMMUNITY

## 2022-09-26 RX ORDER — LIDOCAINE 40 MG/G
CREAM TOPICAL
Status: DISCONTINUED | OUTPATIENT
Start: 2022-09-26 | End: 2022-09-26 | Stop reason: HOSPADM

## 2022-09-26 ASSESSMENT — ACTIVITIES OF DAILY LIVING (ADL): ADLS_ACUITY_SCORE: 31

## 2022-09-26 NOTE — PROGRESS NOTES
OB Triage Note        Assessment and Plan:     Braxton Kilgore is a 42 year old  at 21w2d not in labor.     Patient presents for absent fetal movements >36 hours. An ultrasound was ordered and results were reassuring (see details below) with FHR of 136 bpm. Bedside doppler ultrasound was also done with similar FHR in 140s. Patient was discharged home with plan to follow up with her OB at [facility] at her previously scheduled appointment for later today (10am on ). Patient was advised of return precautions and questions were answered.     Patient discussed with attending physician, Dr. Russell Conde , who agrees with the plan.      Carolina Davis DO PGY1 2022  [facility]       Subjective:     Braxton Kilgore is a  42 year old female at 21w2d with a current prenatal history significant for advanced maternal age and a prior history of SAB x2 and Asherman's s/p hysteroscopic resection of intrauterine adhesions who presents to OB triage with decreased fetal movement. Patient reports she has consistently felt fetal movement at least once per hour for the past 3 weeks but now has felt no movement for approximately 36 hours.       Braxton Kilgore is a patient of Cecy Munoz from [facility]. She received her obstetric care at [facility] and was recently seen by MFM.     She reports no contractions. She denies fluid leakage. She denies bleeding per vagina. Fetal movement is decreased.  Estimated Date of Delivery: 2023 No LMP recorded. Patient is pregnant.       Her prenatal course has been complicated by advanced maternal age, history of 2 prior SABs and Asherman's syndrome.    Prenatal labs:   Lab Results   Component Value Date    ABO A 2005    RH  Pos 2005    AS Neg 2005    HEPBANG Negative 2012    HGB 13.4 2019          Review of Systems:     CONSTITUTIONAL: no fatigue, no  "unexpected change in weight  SKIN: no worrisome rashes or lesions  EYES: no acute vision problems or changes  ENT: no ear problems, no mouth problems, no throat problems  RESP: no significant cough, no shortness of breath  CV: no chest pain, no palpitations, no new or worsening peripheral edema  GI: no nausea, no vomiting, no constipation, no diarrhea  : no frequency, no dysuria, no hematuria  NEURO: no weakness, no dizziness, no headaches  ENDOCRINE: no temperature intolerance, no skin/hair changes  PSYCHIATRIC: NEGATIVE for changes in mood         Physical Exam:   Vitals:   /63 (BP Location: Right arm, Patient Position: Semi-Navarro's, Cuff Size: Adult Regular)   Pulse 88   Temp 98.6  F (37  C) (Oral)   Resp 18   Ht 1.651 m (5' 5\")   Wt 71.2 kg (157 lb)   SpO2 98%   BMI 26.13 kg/m    157 lbs 0 oz  Estimated body mass index is 26.13 kg/m  as calculated from the following:    Height as of this encounter: 1.651 m (5' 5\").    Weight as of this encounter: 71.2 kg (157 lb).    GEN: Awake, alert in no apparent distress   HEENT: grossly normal, atraumatic   NECK: no lymphadenopathy or thryoidomegaly  BACK:  no costovertebral angle tenderness   CARDIOVASCULAR: RRR, no murmur  ABDOMEN: gravid, non-tender  EXT:  no edema or calf tenderness  NEUROPSYCH: anxious affect, appropriate mood for situation    NST interpretation:  Not done today 2/2 gestational age.     Labs today:  Results for orders placed or performed during the hospital encounter of 09/26/22   US OB >14 Weeks Limited wo Fetal Measurement     Status: None    Narrative    EXAM: US OB LIMITED >14 WEEKS WO FETAL MEASUREMENT  LOCATION: [facility]  DATE/TIME: 9/26/2022 2:50 AM    INDICATION: Decreased fetal movement.  COMPARISON: 08/19/2022  TECHNIQUE: Transabdominal and endovaginal ultrasound.    FINDINGS:    Single living fetus, cephalic presentation.    HEART RATE: 136 bpm.  SDP 4.1 cm.  PLACENTA: Anterior. No placenta previa. " Venous lake at the inferior edge of the placenta versus small contained bleed, decreased in size at 2.1 x 0.6 cm, previously 2.3 x 0.8 cm.  CERVIX: Closed and measuring 3.5 cm.    Right corpus luteal cyst.      Impression    IMPRESSION:  1.  Single intrauterine gestation. Cardiac activity 136 bpm.    2.  Along the anterior inferior aspect of the placenta there is an area of a decreased echogenicity likely representing a small fluid collection which could represent a venous lake or a small placental bleed which is decreased in size since prior study.   No placenta previa.    3.  Cervix is closed and measuring 3.5 cm in length.

## 2022-10-26 DIAGNOSIS — K58.9 IRRITABLE BOWEL SYNDROME, UNSPECIFIED TYPE: ICD-10-CM

## 2022-10-27 NOTE — TELEPHONE ENCOUNTER
Outpatient Medication Detail     Disp Refills Start End RON   amitriptyline (ELAVIL) 25 MG tablet (Discontinued) 30 tablet 1 9/20/2022 9/26/2022 No   Sig - Route: Take 1 tablet (25 mg) by mouth daily - Oral   Sent to pharmacy as: Amitriptyline HCl 25 MG Oral Tablet (ELAVIL)   Class: E-Prescribe   Reason for Discontinue: Medication Reconciliation Clean Up   Order: [id number]   E-Prescribing Status: Receipt confirmed by pharmacy (9/20/2022 10:05 PM CDT)

## 2022-11-21 ENCOUNTER — HEALTH MAINTENANCE LETTER (OUTPATIENT)
Age: 42
End: 2022-11-21

## 2022-12-04 ENCOUNTER — HOSPITAL ENCOUNTER (OUTPATIENT)
Facility: CLINIC | Age: 42
Discharge: HOME | End: 2022-12-04
Attending: FAMILY MEDICINE | Admitting: FAMILY MEDICINE
Payer: COMMERCIAL

## 2022-12-04 ENCOUNTER — HOSPITAL ENCOUNTER (OUTPATIENT)
Facility: CLINIC | Age: 42
End: 2022-12-04
Admitting: FAMILY MEDICINE
Payer: COMMERCIAL

## 2022-12-04 VITALS
TEMPERATURE: 98.5 F | HEART RATE: 90 BPM | DIASTOLIC BLOOD PRESSURE: 59 MMHG | SYSTOLIC BLOOD PRESSURE: 100 MMHG | HEIGHT: 65 IN | WEIGHT: 167 LBS | RESPIRATION RATE: 16 BRPM | BODY MASS INDEX: 27.82 KG/M2

## 2022-12-04 PROBLEM — Z36.89 ENCOUNTER FOR TRIAGE IN PREGNANT PATIENT: Status: ACTIVE | Noted: 2022-12-04

## 2022-12-04 PROCEDURE — G0463 HOSPITAL OUTPT CLINIC VISIT: HCPCS

## 2022-12-04 RX ORDER — FERROUS SULFATE 325(65) MG
325 TABLET ORAL
COMMUNITY

## 2022-12-04 RX ORDER — LIDOCAINE 40 MG/G
CREAM TOPICAL
Status: DISCONTINUED | OUTPATIENT
Start: 2022-12-04 | End: 2022-12-04 | Stop reason: HOSPADM

## 2022-12-04 ASSESSMENT — ACTIVITIES OF DAILY LIVING (ADL): ADLS_ACUITY_SCORE: 31

## 2022-12-04 NOTE — PROGRESS NOTES
All questions answered. Discharge instructions given and discharge papers signed. Okay for patient to discharge to home.

## 2022-12-04 NOTE — PROGRESS NOTES
Patient presents to unit at 2:30 pm with complaints of decreased fetal movement. Placed on monitor. Plans to deliver at [facility]. Resident Dr Carson in to assess patient an make a plan.

## 2022-12-04 NOTE — DISCHARGE INSTRUCTIONS
Discharge Instruction for Undelivered Patients      You were seen for: decreased fetal movement     We Consulted: Dr Carson  You had (Test or Medicine):Fetal Non-Stress Test     Diet:   Drink 8 to 12 glasses of liquids (milk, juice, water) every day.  You may eat meals and snacks.     Activity: Count Fetal Kicks  Call Dr or Midwife with any questions or concerns       Call your provider if you notice:  Swelling in your face or increased swelling in your hands or legs.  Headaches that are not relieved by Tylenol (acetaminophen).  Changes in your vision (blurring: seeing spots or stars.)  Nausea (sick to your stomach) and vomiting (throwing up).   Weight gain of 5 pounds or more per week.  Heartburn that doesn't go away.  Signs of bladder infection: pain when you urinate (use the toilet), need to go more often and more urgently.  The bag of patel (rupture of membranes) breaks, or you notice leaking in your underwear.  Bright red blood in your underwear.  Abdominal (lower belly) or stomach pain.  Second (plus) baby: Contractions (tightening) less than 10 minutes apart and getting stronger.  *If less than 34 weeks: Contractions (tightening) more than 6 times in one hour.  Increase or change in vaginal discharge (note the color and amount)      Follow-up:  As scheduled in the clinic          Kick Counts  It s normal to worry about your baby s health. One way you can know your baby s doing well is to record the baby s movements once a day. This is called a kick count.   Remember to take your kick count records to all your appointments with yourhealthcare provider.  How to count kicks    Time how long it takes you to feel 10 kicks, flutters, swishes, or rolls. Ideally, you want to feel at least 10 movements in 2 hours. You will likelyfeel 10 movements in less time than that.  Starting at 28 weeks, count your baby's movements daily. Follow your healthcareprovider's instructions for kick counting. Here are tips for  counting kicks:  Choose a time when the baby is active, such as after a meal.   Sit comfortably or lie on your side.   The first time the baby moves, write down the time.   Count each movement until the baby has moved  10 times. This can take from 20 minutes to 2 hours.   If you haven't felt 10 kicks by the end of the second hour, wait a few hours. Then try again.  Try to do it at the same time each day.  When to call your healthcare provider  Call your healthcare provider  right away if:  You do a couple sets of kick counts during the day and your baby moves fewer than 10 times in 2 hours.  Your baby moves much less often than on the days before.  You haven't felt your baby move all day.  [employer] last reviewed this educational content on8/1/2020 2000-2022 [employer]. All rights reserved. This information is not intended as a substitute for professional medical care. Always follow yourhealthcare professional's instructions.

## 2022-12-04 NOTE — H&P
"OB Triage Note      Assessment and Plan:     Braxton Kilgore is a 42 year old  at 31w1d with a current prenatal history significant for AMA, SAB x2, history of 2 retained placentas, Asherman's syndrome s/p hysteroscopic resection who presents to OB triage with about 6 hours of decreased fetal movement.  NST reactive and reassuring.  No contractions on the monitor.  Patient endorsed return of fetal movement while in triage. Patient otherwise asymptomatic with no concerns or complaints.  Patient has close follow-up with ultrasound this week at her primary OB clinic.  No indication for ultrasound evaluation in triage.    -Discharge home   -Follow-up with primary OB in clinic this week for ultrasound as scheduled  -Follow-up with [facility] for growth ultrasound at 35 weeks as scheduled  -Return precautions reviewed including presenting to [facility] as preferred location of delivery, especially if baby is <35 weeks    Patient discussed with attending physician, Dr. Russell Conde , who agrees with the plan.      Yoanna Luther MD PGY1 2022  [facility]       Subjective:        Braxton Kilgore is a  42 year old female at 31w1d with a current prenatal history significant for AMA, SAB x2, Asherman's syndrome s/p hysteroscopic resection who presents to OB triage with concerns of decreased fetal movement since 9 AM this morning.  Patient states baby typically is active especially during the morning time.  Baby was active last night without any concerns.  Around 9 AM this morning, patient endorses a \"twinge\" in which she could feel baby stretching and moving.  Since then she has not felt any fetal movement and is concerned.  Once patient arrived to triage and was connected to [facility], patient endorses intermittent fetal movement.  Patient endorses some low back pain that is pretty typical for her.  She states she typically takes Tylenol to relieve this. She " "has not taken Tylenol yet today.     Patient is seen by [facility] and plans to deliver at [facility].  She also has been seen by M given history of Asherman's syndrome and concern for placenta accreta with history of 2 retained placentas.  Her most recent ultrasound on 11/28/2022 with an EFW of 36 percentile.  Patient has a history of vaginal bleeding at 16 weeks with a reassuring ultrasound.  Patient was seen here in OB triage on 9/26/2022 for concerns of decreased fetal movement but at that time also had a reassuring strip and ultrasound at that time.     She has a history of anxiety and is not currently taking medications.  She also has a history of anemia and is on oral iron supplement.    Prenatal labs:   Lab Results   Component Value Date    ABO A 12/29/2005    RH  Pos 12/29/2005    AS Neg 12/29/2005    HEPBANG Negative 09/05/2012    HGB 13.4 12/05/2019          Review of Systems:   CONSTITUTIONAL: no fatigue, no unexpected change in weight  SKIN: no worrisome rashes or lesions  EYES: no acute vision problems or changes  ENT: no ear problems, no mouth problems, no throat problems  RESP: no significant cough, no shortness of breath  CV: no chest pain, no palpitations, no new or worsening peripheral edema  GI: no nausea, no vomiting, no constipation, no diarrhea  : no frequency, no dysuria, no hematuria  NEURO: no weakness, no dizziness, no headaches  ENDOCRINE: no temperature intolerance, no skin/hair changes  PSYCHIATRIC: NEGATIVE for changes in mood or trouble with sleep         Physical Exam:   Vitals:   /59   Pulse 90   Temp 98.5  F (36.9  C) (Oral)   Resp 16   Ht 1.651 m (5' 5\")   Wt 75.8 kg (167 lb)   BMI 27.79 kg/m    167 lbs 0 oz  Estimated body mass index is 27.79 kg/m  as calculated from the following:    Height as of this encounter: 1.651 m (5' 5\").    Weight as of this encounter: 75.8 kg (167 lb).    GEN: Awake, alert in no apparent distress   HEENT: grossly " normal  NECK: no lymphadenopathy or thryoidomegaly  RESPIRATORY: clear to auscultation bilaterally, no increased work of breathing  BACK:  no costovertebral angle tenderness   CARDIOVASCULAR: RRR, no murmur  ABDOMEN: gravid, non tender to palpation  PELVIC: Not performed  EXT:  no edema or calf tenderness    NST interpretation:  Baseline rate 124 normal  Accelerations present  Decelerations not present  Interpretation: reactive    Labs today:  No results found for any visits on 12/04/22.

## 2023-01-20 ENCOUNTER — RX ONLY (RX ONLY)
Age: 43
End: 2023-01-20

## 2023-01-20 RX ORDER — TRETIONIN 0.5 MG/G
CREAM TOPICAL QHS
Qty: 45 | Refills: 2 | Status: ERX

## 2023-01-23 ENCOUNTER — RX ONLY (RX ONLY)
Age: 43
End: 2023-01-23

## 2023-01-23 RX ORDER — TRETIONIN 0.5 MG/G
CREAM TOPICAL QHS
Qty: 45 | Refills: 2 | Status: ERX

## 2023-01-30 ENCOUNTER — APPOINTMENT (OUTPATIENT)
Dept: URBAN - METROPOLITAN AREA CLINIC 260 | Age: 43
Setting detail: DERMATOLOGY
End: 2023-01-31

## 2023-01-30 VITALS — HEIGHT: 65 IN | WEIGHT: 150 LBS

## 2023-01-30 DIAGNOSIS — L70.0 ACNE VULGARIS: ICD-10-CM

## 2023-01-30 DIAGNOSIS — D17 BENIGN LIPOMATOUS NEOPLASM: ICD-10-CM

## 2023-01-30 PROBLEM — D17.21 BENIGN LIPOMATOUS NEOPLASM OF SKIN AND SUBCUTANEOUS TISSUE OF RIGHT ARM: Status: ACTIVE | Noted: 2023-01-30

## 2023-01-30 PROCEDURE — OTHER CONSULTATION FOR EXCISION OF BENIGN LESIONS: OTHER

## 2023-01-30 PROCEDURE — OTHER PRESCRIPTION MEDICATION MANAGEMENT: OTHER

## 2023-01-30 PROCEDURE — 99214 OFFICE O/P EST MOD 30 MIN: CPT

## 2023-01-30 PROCEDURE — OTHER CONSULTATION FOR EXCISION: OTHER

## 2023-01-30 PROCEDURE — OTHER COUNSELING: OTHER

## 2023-01-30 PROCEDURE — OTHER MIPS QUALITY: OTHER

## 2023-01-30 ASSESSMENT — LOCATION ZONE DERM
LOCATION ZONE: FACE
LOCATION ZONE: AXILLAE

## 2023-01-30 ASSESSMENT — LOCATION DETAILED DESCRIPTION DERM
LOCATION DETAILED: LEFT INFERIOR MEDIAL MALAR CHEEK
LOCATION DETAILED: RIGHT AXILLARY VAULT

## 2023-01-30 ASSESSMENT — LOCATION SIMPLE DESCRIPTION DERM
LOCATION SIMPLE: LEFT CHEEK
LOCATION SIMPLE: RIGHT AXILLARY VAULT

## 2023-01-30 NOTE — PROCEDURE: COUNSELING
Minocycline Counseling: Patient advised regarding possible photosensitivity and discoloration of the teeth, skin, lips, tongue and gums.  Patient instructed to avoid sunlight, if possible.  When exposed to sunlight, patients should wear protective clothing, sunglasses, and sunscreen.  The patient was instructed to call the office immediately if the following severe adverse effects occur:  hearing changes, easy bruising/bleeding, severe headache, or vision changes.  The patient verbalized understanding of the proper use and possible adverse effects of minocycline.  All of the patient's questions and concerns were addressed.
Minocycline Pregnancy And Lactation Text: This medication is Pregnancy Category D and not consider safe during pregnancy. It is also excreted in breast milk.
Winlevi Counseling:  I discussed with the patient the risks of topical clascoterone including but not limited to erythema, scaling, itching, and stinging. Patient voiced their understanding.
Topical Clindamycin Counseling: Patient counseled that this medication may cause skin irritation or allergic reactions.  In the event of skin irritation, the patient was advised to reduce the amount of the drug applied or use it less frequently.   The patient verbalized understanding of the proper use and possible adverse effects of clindamycin.  All of the patient's questions and concerns were addressed.
Aklief counseling:  Patient advised to apply a pea-sized amount only at bedtime and wait 30 minutes after washing their face before applying.  If too drying, patient may add a non-comedogenic moisturizer.  The most commonly reported side effects including irritation, redness, scaling, dryness, stinging, burning, itching, and increased risk of sunburn.  The patient verbalized understanding of the proper use and possible adverse effects of retinoids.  All of the patient's questions and concerns were addressed.
Tetracycline Counseling: Patient counseled regarding possible photosensitivity and increased risk for sunburn.  Patient instructed to avoid sunlight, if possible.  When exposed to sunlight, patients should wear protective clothing, sunglasses, and sunscreen.  The patient was instructed to call the office immediately if the following severe adverse effects occur:  hearing changes, easy bruising/bleeding, severe headache, or vision changes.  The patient verbalized understanding of the proper use and possible adverse effects of tetracycline.  All of the patient's questions and concerns were addressed. Patient understands to avoid pregnancy while on therapy due to potential birth defects.
Topical Sulfur Applications Counseling: Topical Sulfur Counseling: Patient counseled that this medication may cause skin irritation or allergic reactions.  In the event of skin irritation, the patient was advised to reduce the amount of the drug applied or use it less frequently.   The patient verbalized understanding of the proper use and possible adverse effects of topical sulfur application.  All of the patient's questions and concerns were addressed.
Aklief Pregnancy And Lactation Text: It is unknown if this medication is safe to use during pregnancy.  It is unknown if this medication is excreted in breast milk.  Breastfeeding women should use the topical cream on the smallest area of the skin for the shortest time needed while breastfeeding.  Do not apply to nipple and areola.
Birth Control Pills Pregnancy And Lactation Text: This medication should be avoided if pregnant and for the first 30 days post-partum.
High Dose Vitamin A Pregnancy And Lactation Text: High dose vitamin A therapy is contraindicated during pregnancy and breast feeding.
Erythromycin Counseling:  I discussed with the patient the risks of erythromycin including but not limited to GI upset, allergic reaction, drug rash, diarrhea, increase in liver enzymes, and yeast infections.
Bactrim Pregnancy And Lactation Text: This medication is Pregnancy Category D and is known to cause fetal risk.  It is also excreted in breast milk.
Isotretinoin Pregnancy And Lactation Text: This medication is Pregnancy Category X and is considered extremely dangerous during pregnancy. It is unknown if it is excreted in breast milk.
Bactrim Counseling:  I discussed with the patient the risks of sulfa antibiotics including but not limited to GI upset, allergic reaction, drug rash, diarrhea, dizziness, photosensitivity, and yeast infections.  Rarely, more serious reactions can occur including but not limited to aplastic anemia, agranulocytosis, methemoglobinemia, blood dyscrasias, liver or kidney failure, lung infiltrates or desquamative/blistering drug rashes.
Doxycycline Counseling:  Patient counseled regarding possible photosensitivity and increased risk for sunburn.  Patient instructed to avoid sunlight, if possible.  When exposed to sunlight, patients should wear protective clothing, sunglasses, and sunscreen.  The patient was instructed to call the office immediately if the following severe adverse effects occur:  hearing changes, easy bruising/bleeding, severe headache, or vision changes.  The patient verbalized understanding of the proper use and possible adverse effects of doxycycline.  All of the patient's questions and concerns were addressed.
Tazorac Counseling:  Patient advised that medication is irritating and drying.  Patient may need to apply sparingly and wash off after an hour before eventually leaving it on overnight.  The patient verbalized understanding of the proper use and possible adverse effects of tazorac.  All of the patient's questions and concerns were addressed.
Spironolactone Counseling: Patient advised regarding risks of diarrhea, abdominal pain, hyperkalemia, birth defects (for female patients), liver toxicity and renal toxicity. The patient may need blood work to monitor liver and kidney function and potassium levels while on therapy. The patient verbalized understanding of the proper use and possible adverse effects of spironolactone.  All of the patient's questions and concerns were addressed.
Topical Sulfur Applications Pregnancy And Lactation Text: This medication is Pregnancy Category C and has an unknown safety profile during pregnancy. It is unknown if this topical medication is excreted in breast milk.
Spironolactone Pregnancy And Lactation Text: This medication can cause feminization of the male fetus and should be avoided during pregnancy. The active metabolite is also found in breast milk.
Dapsone Counseling: I discussed with the patient the risks of dapsone including but not limited to hemolytic anemia, agranulocytosis, rashes, methemoglobinemia, kidney failure, peripheral neuropathy, headaches, GI upset, and liver toxicity.  Patients who start dapsone require monitoring including baseline LFTs and weekly CBCs for the first month, then every month thereafter.  The patient verbalized understanding of the proper use and possible adverse effects of dapsone.  All of the patient's questions and concerns were addressed.
Azelaic Acid Pregnancy And Lactation Text: This medication is considered safe during pregnancy and breast feeding.
Detail Level: Zone
Birth Control Pills Counseling: Birth Control Pill Counseling: I discussed with the patient the potential side effects of OCPs including but not limited to increased risk of stroke, heart attack, thrombophlebitis, deep venous thrombosis, hepatic adenomas, breast changes, GI upset, headaches, and depression.  The patient verbalized understanding of the proper use and possible adverse effects of OCPs. All of the patient's questions and concerns were addressed.
Isotretinoin Counseling: Patient should get monthly blood tests, not donate blood, not drive at night if vision affected, not share medication, and not undergo elective surgery for 6 months after tx completed. Side effects reviewed, pt to contact office should one occur.
Doxycycline Pregnancy And Lactation Text: This medication is Pregnancy Category D and not consider safe during pregnancy. It is also excreted in breast milk but is considered safe for shorter treatment courses.
Topical Retinoid Pregnancy And Lactation Text: This medication is Pregnancy Category C. It is unknown if this medication is excreted in breast milk.
Winlevi Pregnancy And Lactation Text: This medication is considered safe during pregnancy and breastfeeding.
Dapsone Pregnancy And Lactation Text: This medication is Pregnancy Category C and is not considered safe during pregnancy or breast feeding.
Benzoyl Peroxide Pregnancy And Lactation Text: This medication is Pregnancy Category C. It is unknown if benzoyl peroxide is excreted in breast milk.
Erythromycin Pregnancy And Lactation Text: This medication is Pregnancy Category B and is considered safe during pregnancy. It is also excreted in breast milk.
Tazorac Pregnancy And Lactation Text: This medication is not safe during pregnancy. It is unknown if this medication is excreted in breast milk.
Topical Clindamycin Pregnancy And Lactation Text: This medication is Pregnancy Category B and is considered safe during pregnancy. It is unknown if it is excreted in breast milk.
Include Pregnancy/Lactation Warning?: No
Azelaic Acid Counseling: Patient counseled that medicine may cause skin irritation and to avoid applying near the eyes.  In the event of skin irritation, the patient was advised to reduce the amount of the drug applied or use it less frequently.   The patient verbalized understanding of the proper use and possible adverse effects of azelaic acid.  All of the patient's questions and concerns were addressed.
Azithromycin Counseling:  I discussed with the patient the risks of azithromycin including but not limited to GI upset, allergic reaction, drug rash, diarrhea, and yeast infections.
Topical Retinoid counseling:  Patient advised to apply a pea-sized amount only at bedtime and wait 30 minutes after washing their face before applying.  If too drying, patient may add a non-comedogenic moisturizer. The patient verbalized understanding of the proper use and possible adverse effects of retinoids.  All of the patient's questions and concerns were addressed.
Detail Level: Simple
Azithromycin Pregnancy And Lactation Text: This medication is considered safe during pregnancy and is also secreted in breast milk.
Benzoyl Peroxide Counseling: Patient counseled that medicine may cause skin irritation and bleach clothing.  In the event of skin irritation, the patient was advised to reduce the amount of the drug applied or use it less frequently.   The patient verbalized understanding of the proper use and possible adverse effects of benzoyl peroxide.  All of the patient's questions and concerns were addressed.
High Dose Vitamin A Counseling: Side effects reviewed, pt to contact office should one occur.
Sarecycline Counseling: Patient advised regarding possible photosensitivity and discoloration of the teeth, skin, lips, tongue and gums.  Patient instructed to avoid sunlight, if possible.  When exposed to sunlight, patients should wear protective clothing, sunglasses, and sunscreen.  The patient was instructed to call the office immediately if the following severe adverse effects occur:  hearing changes, easy bruising/bleeding, severe headache, or vision changes.  The patient verbalized understanding of the proper use and possible adverse effects of sarecycline.  All of the patient's questions and concerns were addressed.

## 2023-01-30 NOTE — PROCEDURE: PRESCRIPTION MEDICATION MANAGEMENT
Render In Strict Bullet Format?: No
Detail Level: Zone
Continue Regimen: Tretinoin cream 1-2 times at night (okay to use during breastfeeding/pumping)\\nAzelaic acid bid

## 2023-03-21 DIAGNOSIS — K58.9 IRRITABLE BOWEL SYNDROME, UNSPECIFIED TYPE: ICD-10-CM

## 2023-03-21 NOTE — TELEPHONE ENCOUNTER
Outpatient Medication Detail     Disp Refills Start End RON   amitriptyline (ELAVIL) 25 MG tablet (Discontinued) 30 tablet 1 9/20/2022 9/26/2022 No   Sig - Route: Take 1 tablet (25 mg) by mouth daily - Oral   Sent to pharmacy as: Amitriptyline HCl 25 MG Oral Tablet (ELAVIL)   Class: E-Prescribe

## 2023-09-16 ENCOUNTER — HEALTH MAINTENANCE LETTER (OUTPATIENT)
Age: 43
End: 2023-09-16

## 2024-02-03 ENCOUNTER — HEALTH MAINTENANCE LETTER (OUTPATIENT)
Age: 44
End: 2024-02-03

## 2024-05-06 ENCOUNTER — HOSPITAL ENCOUNTER (EMERGENCY)
Facility: CLINIC | Age: 44
Discharge: HOME | End: 2024-05-06
Attending: EMERGENCY MEDICINE | Admitting: EMERGENCY MEDICINE
Payer: COMMERCIAL

## 2024-05-06 VITALS
HEIGHT: 65 IN | WEIGHT: 145 LBS | BODY MASS INDEX: 24.16 KG/M2 | RESPIRATION RATE: 16 BRPM | HEART RATE: 74 BPM | TEMPERATURE: 98.7 F | OXYGEN SATURATION: 98 % | DIASTOLIC BLOOD PRESSURE: 69 MMHG | SYSTOLIC BLOOD PRESSURE: 123 MMHG

## 2024-05-06 DIAGNOSIS — G44.209 TENSION HEADACHE: ICD-10-CM

## 2024-05-06 DIAGNOSIS — S16.1XXA CERVICAL STRAIN, INITIAL ENCOUNTER: ICD-10-CM

## 2024-05-06 PROCEDURE — 99283 EMERGENCY DEPT VISIT LOW MDM: CPT

## 2024-05-06 RX ORDER — METHOCARBAMOL 500 MG/1
500 TABLET, FILM COATED ORAL 3 TIMES DAILY
Qty: 30 TABLET | Refills: 0 | Status: SHIPPED | OUTPATIENT
Start: 2024-05-06

## 2024-05-06 ASSESSMENT — COLUMBIA-SUICIDE SEVERITY RATING SCALE - C-SSRS
1. IN THE PAST MONTH, HAVE YOU WISHED YOU WERE DEAD OR WISHED YOU COULD GO TO SLEEP AND NOT WAKE UP?: NO
2. HAVE YOU ACTUALLY HAD ANY THOUGHTS OF KILLING YOURSELF IN THE PAST MONTH?: NO
6. HAVE YOU EVER DONE ANYTHING, STARTED TO DO ANYTHING, OR PREPARED TO DO ANYTHING TO END YOUR LIFE?: NO

## 2024-05-06 ASSESSMENT — ACTIVITIES OF DAILY LIVING (ADL): ADLS_ACUITY_SCORE: 33

## 2024-05-06 NOTE — ED PROVIDER NOTES
EMERGENCY DEPARTMENT ENCOUNTER      NAME: Braxton Kilgore  AGE: 43 year old female  YOB: 1980  MRN: [id number]  EVALUATION DATE & TIME: No admission date for patient encounter.    PCP: Cecy Mills    ED PROVIDER: Brandon Hayes M.D.      No chief complaint on file.        FINAL IMPRESSION:  Tension head  Cervical strain      ED COURSE & MEDICAL DECISION MAKING:    Pertinent Labs & Imaging studies reviewed. (See chart for details)  43 year old female presents to the Emergency Department for evaluation of neck tightness and headache.  Patient reports approximately 2 to 3 weeks ago while in a training exercise patient went to a matted floor striking her head.  There is no loss conscious.  She is able to get up without difficulties and continued her training.  She started developing tightness in her upper shoulders and then her neck.  After approximately 2 weeks developed more of a achiness in the posterior head.  Only feels it when she starts moving her head or neck.  Has been taking over-the-counter medications without improvement.  Examination reveals well-nourished alert female in mild distress.  Vital signs unremarkable.  Heart and lungs normal.  Neurologically patient is intact.  Patient with out midline bony cervical tenderness.  Mild paraspinal tenderness and tenderness in the trapezoids.  Patient likely with cervical strain and secondary tension headache given movement triggers for symptoms.  No indications for laboratory evaluation or imaging.  Will continue outpatient management.  Encouraged to continue over-the-counter medications.  Robaxin given for additional symptom  relief. Patient appears non toxic with stable vitals signs. Overall exam is benign.          7:07 PM I met with the patient for the initial interview and physical examination. Discussed plan for treatment and workup in the ED. Made plans for discharge.     At the conclusion of the encounter I discussed the results of  all of the tests and the disposition. The questions were answered and return precautions provided. The patient or family acknowledged understanding and was agreeable with the care plan.           MEDICATIONS GIVEN IN THE EMERGENCY:  Medications - No data to display    NEW PRESCRIPTIONS STARTED AT TODAY'S ER VISIT  No results found for any visits on 05/06/24.        =================================================================    HPI    Patient information was obtained from: patient    Use of Intrepreter: N/A         Braxton Kilgore is a 43 year old female with a pertient medical history of low back pain, iron deficiency anemia, who presents to the ED for evaluation of headache.     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 This occurred approximately 3 weeks ago, and after the initial incident occurred she felt dizzy. She eventually developed stiffness on the left side of her neck, and then one week later she developed a headache/pain in the back of her head. She is still participating in physical activity at work. Patient reports taking tylenol, and using lidocaine patches for her pain.       REVIEW OF SYSTEMS   Constitutional:  Denies fever, chills  Respiratory:  Denies productive cough or increased work of breathing  Cardiovascular:  Denies chest pain, palpitations  GI:  Denies abdominal pain, nausea, vomiting, or change in bowel or bladder habits   Musculoskeletal:  Denies any new muscle/joint swelling  Skin:  Denies rash   Neurologic:  Denies focal weakness  All systems negative except as marked.     PAST MEDICAL HISTORY:  History reviewed. No pertinent past medical history.    PAST SURGICAL HISTORY:  Past Surgical History:   Procedure Laterality Date    DILATION AND CURETTAGE      DILATION AND CURETTAGE      DILATION AND CURETTAGE  2008 and 2011    DILATION AND CURETTAGE, DIAGNOSTIC / THERAPEUTIC   2011, 2016    for miscarriages    HC TOOTH EXTRACTION W/FORCEP      HYSTEROSCOPY      HYSTEROSCOPY DIAGNOSTIC  2016    x2 for infertility evaluation    OPERATIVE HYSTEROSCOPY N/A 7/19/2016    Procedure: OPERATIVE HYSTEROSCOPY;  Surgeon: Sangita Masters MD;  Location: [facility] OR         CURRENT MEDICATIONS:    No current facility-administered medications for this encounter.    Current Outpatient Medications:     ferrous sulfate (FEROSUL) 325 (65 Fe) MG tablet, Take 325 mg by mouth daily (with breakfast), Disp: , Rfl:     ondansetron (ZOFRAN ODT) 4 MG ODT tab, DISSOLVE 1 TABLET BY MOUTH EVERY 8 HOURS AS NEEDED FOR NAUSEA, Disp: 20 tablet, Rfl: 0    Prenatal Vit-Fe Fumarate-FA (PRENATAL MULTIVITAMIN W/IRON) 27-0.8 MG tablet, Take 1 tablet by mouth daily, Disp: , Rfl:     ALLERGIES:  Allergies   Allergen Reactions    Ibuprofen Other (See Comments)     Has IBS and it hurts her stomach really bad to take Ibuprofen.    Sulfa Antibiotics Rash    Toradol [Ketorolac Tromethamine] Other (See Comments)     Has IBS and it hurts her stomach really bad to take Toradol.       FAMILY HISTORY:  Family History   Problem Relation Age of Onset    Hypertension Maternal Grandmother     Cerebrovascular Disease Maternal Grandfather     Allergies Father     Allergies Paternal Grandfather     Gastrointestinal Disease Mother         IBS    Gastrointestinal Disease Maternal Grandmother         IBS    Osteoporosis Maternal Grandmother     Psychotic Disorder Paternal Grandmother     Irritable Bowel Syndrome Mother     Irritable Bowel Syndrome Maternal Aunt     Irritable Bowel Syndrome Maternal Uncle     Irritable Bowel Syndrome Maternal Grandmother     Hypertension Paternal Grandmother     Parkinsonism Paternal Grandfather     Hypertension Father        SOCIAL HISTORY:   Social History     Socioeconomic History    Marital status:      Spouse name: None    Number of children: 1    Years of education: None    Highest education level: None    Occupational History     Employer: [employer]UDENT   Tobacco Use    Smoking status: Never    Smokeless tobacco: Never   Substance and Sexual Activity    Alcohol use: Not Currently     Comment: Alcoholic Drinks/day: 1 bottle of wine a month    Drug use: No    Sexual activity: Yes     Partners: Male     Birth control/protection: Pill, I.U.D.   Social History Narrative    Social Documentation:        Balanced Diet: YES    Calcium intake: 2-3 per day    Caffeine: 1-2 tea no coffee per day    Exercise:  type of activity on her feet a lot, work out 2-3 times per week    Sunscreen: Yes    Seatbelts:  Yes    Self Breast Exam:  Yes- not monthly    Physical/Emotional/Sexual Abuse: No     Do you feel safe in your environment? Yes        Cholesterol screen up to date: No: never has this checked nothing noted in FX HX    Eye Exam up to date: Yes    Dental Exam up to date: Yes    Pap smear up to date: Yes, do today.  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 Exercises 5x/week at [facility]. No alcohol or smoking hx.   Cecy Mills MD         VITALS:  No data found.     PHYSICAL EXAM    Constitutional:  Awake, alert, in mild distress  HENT:  Normocephalic, Atraumatic. Bilateral external ears normal. Oropharynx moist. Nose normal. Neck- Normal range of motion with no guarding, minimal midline posterior cervical strap tenderness, mild tenderness of occiput bilaterally, minimal medial trapezial tenderness. Supple, No stridor.   Eyes:  PERRL, EOMI with no signs of entrapment, Conjunctiva normal, No discharge.   Respiratory:  Normal breath sounds, No respiratory distress,  No wheezing.    Cardiovascular:  Normal heart rate, Normal rhythm, No appreciable rubs or gallops.   GI:  Soft, No tenderness, No distension, No palpable masses  Musculoskeletal: No edema. Good range of motion in all major joints. No tenderness to palpation or major deformities noted.  Integument:  Warm, Dry, No erythema, No rash.   Neurologic:  Alert & oriented, Normal motor function, Normal sensory function, No focal deficits noted.   Psychiatric:  Affect normal, Judgment normal, Mood normal.         I, Zoe Boone, am serving as a scribe to document services personally performed by Brandon Hayes MD, based on my observation and the provider's statements to me. I, Brandon Hayes MD attest that Zoe Boone is acting in a scribe capacity, has observed my performance of the services and has documented them in accordance with my direction.    Brandon Hayes M.D.  Emergency Medicine  [facility] EMERGENCY ROOM     Brandon Hayes MD  05/06/24 [id number]

## 2024-05-06 NOTE — ED TRIAGE NOTES
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  Denies LOC but has continued to have neck pain and headaches.  Worse with any movement.     Triage Assessment (Adult)       Row Name 05/06/24 1827          Triage Assessment    Airway WDL WDL        Respiratory WDL    Respiratory WDL WDL        Skin Circulation/Temperature WDL    Skin Circulation/Temperature WDL WDL        Cardiac WDL    Cardiac WDL WDL        Peripheral/Neurovascular WDL    Peripheral Neurovascular WDL WDL        Cognitive/Neuro/Behavioral WDL    Cognitive/Neuro/Behavioral WDL WDL

## 2024-11-09 ENCOUNTER — HEALTH MAINTENANCE LETTER (OUTPATIENT)
Age: 44
End: 2024-11-09